# Patient Record
Sex: FEMALE | Race: WHITE | NOT HISPANIC OR LATINO | Employment: OTHER
[De-identification: names, ages, dates, MRNs, and addresses within clinical notes are randomized per-mention and may not be internally consistent; named-entity substitution may affect disease eponyms.]

---

## 2017-04-07 ENCOUNTER — RECORDS - HEALTHEAST (OUTPATIENT)
Dept: ADMINISTRATIVE | Facility: OTHER | Age: 62
End: 2017-04-07

## 2017-04-19 ENCOUNTER — RECORDS - HEALTHEAST (OUTPATIENT)
Dept: ADMINISTRATIVE | Facility: OTHER | Age: 62
End: 2017-04-19

## 2017-05-02 ENCOUNTER — RECORDS - HEALTHEAST (OUTPATIENT)
Dept: ADMINISTRATIVE | Facility: OTHER | Age: 62
End: 2017-05-02

## 2017-05-05 ENCOUNTER — RECORDS - HEALTHEAST (OUTPATIENT)
Dept: ADMINISTRATIVE | Facility: OTHER | Age: 62
End: 2017-05-05

## 2018-03-16 ENCOUNTER — RECORDS - HEALTHEAST (OUTPATIENT)
Dept: ADMINISTRATIVE | Facility: OTHER | Age: 63
End: 2018-03-16

## 2018-05-08 ENCOUNTER — RECORDS - HEALTHEAST (OUTPATIENT)
Dept: ADMINISTRATIVE | Facility: OTHER | Age: 63
End: 2018-05-08

## 2018-09-26 ENCOUNTER — RECORDS - HEALTHEAST (OUTPATIENT)
Dept: ADMINISTRATIVE | Facility: OTHER | Age: 63
End: 2018-09-26

## 2018-09-26 LAB
ALT SERPL W/O P-5'-P-CCNC: 19 U/L (ref 6–29)
AST SERPL-CCNC: 19 U/L (ref 10–35)
CHOLEST SERPL-MCNC: 247 MG/DL
CREAT SERPL-MCNC: 0.8 MG/DL (ref 0.5–0.99)
GFR ESTIMATE EXT - HISTORICAL: 79 ML/MIN/1.73M2
GFR ESTIMATE, IF BLACK EXT - HISTORICAL: 92 ML/MIN/1.73M2
HDLC SERPL-MCNC: 62 MG/DL
LDLC SERPL CALC-MCNC: 167 MG/DL
NON HDL CHOL. (LDL+VLDL): 185 MG/DL
TRIGLYCERIDES (HISTORICAL CONVERSION): 78 MG/DL

## 2018-10-05 ENCOUNTER — TRANSFERRED RECORDS (OUTPATIENT)
Dept: HEALTH INFORMATION MANAGEMENT | Facility: CLINIC | Age: 63
End: 2018-10-05

## 2018-11-12 ENCOUNTER — RECORDS - HEALTHEAST (OUTPATIENT)
Dept: ADMINISTRATIVE | Facility: OTHER | Age: 63
End: 2018-11-12

## 2019-05-03 ENCOUNTER — RECORDS - HEALTHEAST (OUTPATIENT)
Dept: ADMINISTRATIVE | Facility: OTHER | Age: 64
End: 2019-05-03

## 2019-05-03 LAB
ALT SERPL W/O P-5'-P-CCNC: 16 U/L (ref 6–29)
AST SERPL-CCNC: 17 U/L (ref 10–35)
CHOLEST SERPL-MCNC: 196 MG/DL
CREAT SERPL-MCNC: 0.69 MG/DL (ref 0.5–0.99)
HDLC SERPL-MCNC: 53 MG/DL
LDLC SERPL CALC-MCNC: 128 MG/DL
NON HDL CHOL. (LDL+VLDL): 143 MG/DL
TRIGLYCERIDES (HISTORICAL CONVERSION): 59 MG/DL

## 2019-05-07 ENCOUNTER — RECORDS - HEALTHEAST (OUTPATIENT)
Dept: ADMINISTRATIVE | Facility: OTHER | Age: 64
End: 2019-05-07

## 2019-08-06 ENCOUNTER — RECORDS - HEALTHEAST (OUTPATIENT)
Dept: ADMINISTRATIVE | Facility: OTHER | Age: 64
End: 2019-08-06

## 2019-10-24 ENCOUNTER — RECORDS - HEALTHEAST (OUTPATIENT)
Dept: ADMINISTRATIVE | Facility: OTHER | Age: 64
End: 2019-10-24

## 2021-03-31 ENCOUNTER — OFFICE VISIT - HEALTHEAST (OUTPATIENT)
Dept: FAMILY MEDICINE | Facility: CLINIC | Age: 66
End: 2021-03-31

## 2021-03-31 DIAGNOSIS — Z12.11 SCREEN FOR COLON CANCER: ICD-10-CM

## 2021-03-31 DIAGNOSIS — R20.2 ARM PARESTHESIA, LEFT: ICD-10-CM

## 2021-03-31 DIAGNOSIS — S46.812A TRAPEZIUS STRAIN, LEFT, INITIAL ENCOUNTER: ICD-10-CM

## 2021-03-31 DIAGNOSIS — R03.0 ELEVATED BLOOD PRESSURE READING WITHOUT DIAGNOSIS OF HYPERTENSION: ICD-10-CM

## 2021-03-31 DIAGNOSIS — R29.898 LEFT HAND WEAKNESS: ICD-10-CM

## 2021-03-31 RX ORDER — CHLORAL HYDRATE 500 MG
2 CAPSULE ORAL DAILY
Status: SHIPPED | COMMUNITY
Start: 2021-03-31

## 2021-03-31 RX ORDER — OXYBUTYNIN CHLORIDE 10 MG/1
TABLET, EXTENDED RELEASE ORAL
Status: SHIPPED | COMMUNITY
Start: 2021-03-31 | End: 2021-08-05

## 2021-03-31 ASSESSMENT — MIFFLIN-ST. JEOR: SCORE: 1256.88

## 2021-03-31 ASSESSMENT — PATIENT HEALTH QUESTIONNAIRE - PHQ9: SUM OF ALL RESPONSES TO PHQ QUESTIONS 1-9: 6

## 2021-04-05 ENCOUNTER — RECORDS - HEALTHEAST (OUTPATIENT)
Dept: ADMINISTRATIVE | Facility: OTHER | Age: 66
End: 2021-04-05

## 2021-04-08 ENCOUNTER — RECORDS - HEALTHEAST (OUTPATIENT)
Dept: ADMINISTRATIVE | Facility: OTHER | Age: 66
End: 2021-04-08

## 2021-04-19 ENCOUNTER — OFFICE VISIT - HEALTHEAST (OUTPATIENT)
Dept: FAMILY MEDICINE | Facility: CLINIC | Age: 66
End: 2021-04-19

## 2021-04-19 DIAGNOSIS — R03.0 ELEVATED BLOOD PRESSURE READING WITHOUT DIAGNOSIS OF HYPERTENSION: ICD-10-CM

## 2021-04-19 DIAGNOSIS — E04.1 THYROID NODULE: ICD-10-CM

## 2021-04-19 LAB
ANION GAP SERPL CALCULATED.3IONS-SCNC: 9 MMOL/L (ref 5–18)
BUN SERPL-MCNC: 13 MG/DL (ref 8–22)
CALCIUM SERPL-MCNC: 8.2 MG/DL (ref 8.5–10.5)
CHLORIDE BLD-SCNC: 107 MMOL/L (ref 98–107)
CO2 SERPL-SCNC: 25 MMOL/L (ref 22–31)
CREAT SERPL-MCNC: 0.7 MG/DL (ref 0.6–1.1)
GFR SERPL CREATININE-BSD FRML MDRD: >60 ML/MIN/1.73M2
GLUCOSE BLD-MCNC: 89 MG/DL (ref 70–125)
POTASSIUM BLD-SCNC: 3.7 MMOL/L (ref 3.5–5)
SODIUM SERPL-SCNC: 141 MMOL/L (ref 136–145)
TSH SERPL DL<=0.005 MIU/L-ACNC: 1.31 UIU/ML (ref 0.3–5)

## 2021-04-19 RX ORDER — MELOXICAM 15 MG/1
TABLET ORAL
Status: SHIPPED | COMMUNITY
Start: 2021-04-05 | End: 2021-08-03

## 2021-04-19 RX ORDER — GABAPENTIN 300 MG/1
CAPSULE ORAL
Status: SHIPPED | COMMUNITY
Start: 2021-04-05 | End: 2022-07-26

## 2021-04-19 ASSESSMENT — MIFFLIN-ST. JEOR: SCORE: 1265.96

## 2021-04-20 ENCOUNTER — RECORDS - HEALTHEAST (OUTPATIENT)
Dept: HEALTH INFORMATION MANAGEMENT | Facility: CLINIC | Age: 66
End: 2021-04-20

## 2021-05-10 ENCOUNTER — RECORDS - HEALTHEAST (OUTPATIENT)
Dept: ADMINISTRATIVE | Facility: OTHER | Age: 66
End: 2021-05-10

## 2021-05-24 ENCOUNTER — RECORDS - HEALTHEAST (OUTPATIENT)
Dept: ADMINISTRATIVE | Facility: CLINIC | Age: 66
End: 2021-05-24

## 2021-05-25 ENCOUNTER — RECORDS - HEALTHEAST (OUTPATIENT)
Dept: ADMINISTRATIVE | Facility: CLINIC | Age: 66
End: 2021-05-25

## 2021-05-27 ASSESSMENT — PATIENT HEALTH QUESTIONNAIRE - PHQ9: SUM OF ALL RESPONSES TO PHQ QUESTIONS 1-9: 6

## 2021-06-05 VITALS
HEART RATE: 66 BPM | HEIGHT: 68 IN | TEMPERATURE: 97.5 F | DIASTOLIC BLOOD PRESSURE: 67 MMHG | WEIGHT: 150 LBS | BODY MASS INDEX: 22.73 KG/M2 | SYSTOLIC BLOOD PRESSURE: 140 MMHG

## 2021-06-05 VITALS
BODY MASS INDEX: 22.43 KG/M2 | HEIGHT: 68 IN | TEMPERATURE: 97.6 F | SYSTOLIC BLOOD PRESSURE: 154 MMHG | HEART RATE: 73 BPM | DIASTOLIC BLOOD PRESSURE: 81 MMHG | WEIGHT: 148 LBS

## 2021-06-16 NOTE — PATIENT INSTRUCTIONS - HE
Steroid Dosepak to help with inflammation, continue tizanidine.  If needed you can take Tylenol for additional pain relief.    We will help you schedule with [facility] for additional care.

## 2021-06-16 NOTE — PROGRESS NOTES
Assessment/plan   1. Trapezius strain, left, initial encounter  2. Arm paresthesia, left  3. Left hand weakness  Acute onset atraumatic left lateral mid back pain with associated left arm paresthesias and weakness.  Left trapezius and rhomboids tender to palpation on exam, with notably decreased left  strength.  She has already completed a short course of prednisone with minimal improvement.  We will have her complete a Medrol Dosepak, continue with tizanidine, and Tylenol as needed.  Given associated neurologic deficits due to suspected nerve impingement we will have her see orthopedics, scheduled 4/5/2021.  - Ambulatory referral to Orthopedics  - methylPREDNISolone (MEDROL DOSEPACK) 4 mg tablet; Take 1 tablet (4 mg total) by mouth daily for 6 days. Follow package directions  Dispense: 21 tablet; Refill: 0    4. Screen for colon cancer  Initial screen, no FH of colon cancer.   - Ambulatory referral for Colonoscopy    5. Elevated blood pressure reading without diagnosis of hypertension  Blood pressure elevated today, and has been borderline in the past per her report.  She is in acute discomfort today.  She will check her blood pressure 1-2 times daily at home and return in 2 weeks for follow-up.    Follow up: 2 weeks BP    Gregoria Michaels DO    Options for treatment and follow-up care were reviewed with the patient. Felicita Henderson engaged in the decision making process and verbalized understanding of the options discussed and agreed with the final plan.    This note has been dictated using voice recognition software. Any grammatical or context distortions are unintentional and inherent to the software.      Subjective:      HPI: Felicita Henderson is a 65 y.o. female who is here for:    Chief Complaint   Patient presents with     Establish Care     has lived in [place] for the last 15 years     Muscle Pain     muscle pain f/u from urgency room, center of shoulder blade pain, left arm has been numb for a week, no   "in hand     Evening of 3/23 developed pain in right shoulder blade, deep under bone.  No trauma, unsure what caused its onset.  Dull ache, no shooting pains. Left arm is numb with asociated pain of left elbow. Weakness in left hand, no  strength.  Unable to open pill bottles, flush toilet due to weak .  She is a right-handed person.  Took 5 days of prednisone from urgent care visit 3/25. Thought she was getting better but then symptoms returned. Taking tizanidine three times a day, little relief with this and did not take any today. No pain in neck, can feel pullig sensation under her left shoulder blade. No headaches or vision changes.     Objective:    /81   Pulse 73   Temp 97.6  F (36.4  C) (Oral)   Ht 5' 7.5\" (1.715 m)   Wt 148 lb (67.1 kg)   BMI 22.84 kg/m      Physical Exam:   General: Alert, pleasant, cooperative, NAD  CV: RRR  Respiratory: normal effort, lungs CTAB with good aeration throughout  Abdomen:  soft,  ND, NT  Back: No CVA tenderness  Neck: Cervical spine non-tender to palpation. Full ROM. Negative Spurlings.  Shoulder: Skin without erythema, swelling or ecchymosis. Full ROM.  Tender to palpation left rhomboids and trapezius, deep to medial spine of left scapula.  No winging of scapula or abnormal scapular movements noted. No signs of instability with apprehension and relocation maneuvers.   Psych: mood neutral and affect appropriate  Neuro: A&O x 3, CN II-XII grossly intact, paresthesias proximal left upper extremity and at elbow.  Left  strength 2/5.  Skin: warm, no rashes on exposed areas of skin    No results found for this or any previous visit (from the past 24 hour(s)).  "

## 2021-06-16 NOTE — PROGRESS NOTES
Assessment/plan   1. Elevated blood pressure reading without diagnosis of hypertension  Blood pressure initially elevated but at goal on recheck.  Patient would like to avoid medication, so feel it is fine to hold off for now.  We will get baseline electrolyte and renal function in case medications need to be started in the future.  In the meantime she we will continue to work on a healthy diet and try to incorporate more exercise into her routine.  - Basic Metabolic Panel    2. Thyroid nodule  History of thyroid nodule s/p FNA in 9/2015 which was negative for atypia but showed some features of possible lymphocytic thyroiditis.  Recent cervical MRI through [facility] showed a 29 mm left thyroid nodule.  Will assess further with thyroid ultrasound and thyroid cascade.  Nontender on exam today.  - Thyroid [place]  - US Thyroid; Future      Follow up: 1 month routine physical    Gregoria Michaels DO    Options for treatment and follow-up care were reviewed with the patient. Felicita Henderson engaged in the decision making process and verbalized understanding of the options discussed and agreed with the final plan.    This note has been dictated using voice recognition software. Any grammatical or context distortions are unintentional and inherent to the software.      Subjective:      HPI: Felicita Henderson is a 65 y.o. female who is here for:    Chief Complaint   Patient presents with     Hypertension     f/u     Patient returns for blood pressure follow-up.  She has not been checking her blood pressure at home.  She states she checked it once and it was high, approximately 160 systolic.  She feels her blood pressure was elevated during her last visit due to acute back pain for which she has been seeing [facility] for.  She had a herniated disc which is slowly improving with meloxicam.  Her brother is also being treated for high blood pressure.  She has been watching her cholesterol but has not been doing  "much activity.  She ideally would like to avoid medication if possible.    She also would like to discuss her thyroid nodule which was discovered in 9/2015.  She had an aspiration at that time.  She states her thyroid function was normal.  She does not believe it has grown in size.  Was recently seen on her MRI from [facility].    Objective:    /67   Pulse 66   Temp 97.5  F (36.4  C) (Oral)   Ht 5' 7.5\" (1.715 m)   Wt 150 lb (68 kg)   BMI 23.15 kg/m      Physical Exam:   General: Alert, pleasant, cooperative, NAD  HEENT: Left inferior thyroid nodule approximately 2 to 3 cm, nontender  CV: RRR, no murmurs, rubs or gallops  Respiratory: normal effort, lungs CTAB with good aeration throughout  Psych: mood neutral and affect appropriate    No results found for this or any previous visit (from the past 24 hour(s)).  "

## 2021-08-02 ENCOUNTER — HOSPITAL ENCOUNTER (OUTPATIENT)
Dept: ULTRASOUND IMAGING | Facility: HOSPITAL | Age: 66
Discharge: HOME | End: 2021-08-02
Attending: FAMILY MEDICINE | Admitting: FAMILY MEDICINE
Payer: MEDICARE

## 2021-08-02 DIAGNOSIS — E04.1 THYROID NODULE: ICD-10-CM

## 2021-08-02 PROCEDURE — 76536 US EXAM OF HEAD AND NECK: CPT

## 2021-08-03 ENCOUNTER — OFFICE VISIT (OUTPATIENT)
Dept: FAMILY MEDICINE | Facility: CLINIC | Age: 66
End: 2021-08-03
Payer: MEDICARE

## 2021-08-03 ENCOUNTER — TELEPHONE (OUTPATIENT)
Dept: FAMILY MEDICINE | Facility: CLINIC | Age: 66
End: 2021-08-03

## 2021-08-03 VITALS
TEMPERATURE: 98 F | BODY MASS INDEX: 22.28 KG/M2 | HEART RATE: 77 BPM | WEIGHT: 147 LBS | DIASTOLIC BLOOD PRESSURE: 79 MMHG | SYSTOLIC BLOOD PRESSURE: 121 MMHG | HEIGHT: 68 IN

## 2021-08-03 DIAGNOSIS — E78.2 MIXED HYPERLIPIDEMIA: ICD-10-CM

## 2021-08-03 DIAGNOSIS — Z78.0 POSTMENOPAUSAL STATUS: ICD-10-CM

## 2021-08-03 DIAGNOSIS — I49.9 IRREGULAR HEARTBEAT: ICD-10-CM

## 2021-08-03 DIAGNOSIS — Z00.00 ENCOUNTER FOR MEDICARE ANNUAL WELLNESS EXAM: Primary | ICD-10-CM

## 2021-08-03 DIAGNOSIS — Z23 NEED FOR PNEUMOCOCCAL VACCINE: ICD-10-CM

## 2021-08-03 DIAGNOSIS — Z86.79 HISTORY OF MITRAL VALVE PROLAPSE: ICD-10-CM

## 2021-08-03 DIAGNOSIS — Z12.11 SCREEN FOR COLON CANCER: ICD-10-CM

## 2021-08-03 DIAGNOSIS — Z12.31 ENCOUNTER FOR SCREENING MAMMOGRAM FOR MALIGNANT NEOPLASM OF BREAST: ICD-10-CM

## 2021-08-03 DIAGNOSIS — E04.1 THYROID NODULE: ICD-10-CM

## 2021-08-03 DIAGNOSIS — M85.80 OSTEOPENIA, UNSPECIFIED LOCATION: ICD-10-CM

## 2021-08-03 PROBLEM — E78.5 HYPERLIPIDEMIA: Status: ACTIVE | Noted: 2021-08-03

## 2021-08-03 LAB
CHOLEST SERPL-MCNC: 247 MG/DL
ERYTHROCYTE [DISTWIDTH] IN BLOOD BY AUTOMATED COUNT: 13.1 % (ref 10–15)
FASTING STATUS PATIENT QL REPORTED: ABNORMAL
HCT VFR BLD AUTO: 41.4 % (ref 35–47)
HDLC SERPL-MCNC: 57 MG/DL
HGB BLD-MCNC: 13.4 G/DL (ref 11.7–15.7)
LDLC SERPL CALC-MCNC: 175 MG/DL
MCH RBC QN AUTO: 27.9 PG (ref 26.5–33)
MCHC RBC AUTO-ENTMCNC: 32.4 G/DL (ref 31.5–36.5)
MCV RBC AUTO: 86 FL (ref 78–100)
PLATELET # BLD AUTO: 207 10E3/UL (ref 150–450)
RBC # BLD AUTO: 4.8 10E6/UL (ref 3.8–5.2)
TRIGL SERPL-MCNC: 76 MG/DL
WBC # BLD AUTO: 3.9 10E3/UL (ref 4–11)

## 2021-08-03 PROCEDURE — 90471 IMMUNIZATION ADMIN: CPT | Performed by: FAMILY MEDICINE

## 2021-08-03 PROCEDURE — 90715 TDAP VACCINE 7 YRS/> IM: CPT | Performed by: FAMILY MEDICINE

## 2021-08-03 PROCEDURE — 85027 COMPLETE CBC AUTOMATED: CPT | Performed by: FAMILY MEDICINE

## 2021-08-03 PROCEDURE — 93005 ELECTROCARDIOGRAM TRACING: CPT | Performed by: FAMILY MEDICINE

## 2021-08-03 PROCEDURE — 36415 COLL VENOUS BLD VENIPUNCTURE: CPT | Performed by: FAMILY MEDICINE

## 2021-08-03 PROCEDURE — 93010 ELECTROCARDIOGRAM REPORT: CPT | Mod: OFF | Performed by: INTERNAL MEDICINE

## 2021-08-03 PROCEDURE — 99214 OFFICE O/P EST MOD 30 MIN: CPT | Mod: 25 | Performed by: FAMILY MEDICINE

## 2021-08-03 PROCEDURE — 80061 LIPID PANEL: CPT | Performed by: FAMILY MEDICINE

## 2021-08-03 PROCEDURE — G0402 INITIAL PREVENTIVE EXAM: HCPCS | Performed by: FAMILY MEDICINE

## 2021-08-03 PROCEDURE — G0009 ADMIN PNEUMOCOCCAL VACCINE: HCPCS | Mod: 59 | Performed by: FAMILY MEDICINE

## 2021-08-03 PROCEDURE — 90732 PPSV23 VACC 2 YRS+ SUBQ/IM: CPT | Performed by: FAMILY MEDICINE

## 2021-08-03 ASSESSMENT — ACTIVITIES OF DAILY LIVING (ADL): CURRENT_FUNCTION: NO ASSISTANCE NEEDED

## 2021-08-03 ASSESSMENT — MIFFLIN-ST. JEOR: SCORE: 1252.35

## 2021-08-03 NOTE — PATIENT INSTRUCTIONS
Please call your insurance company to check on Shingrix vaccine coverage (the updated shingles vaccine)    Check on thyroid ultrasound from 2015.       Patient Education   Personalized Prevention Plan  You are due for the preventive services outlined below.  Your care team is available to assist you in scheduling these services.  If you have already completed any of these items, please share that information with your care team to update in your medical record.  Health Maintenance Due   Topic Date Due     ANNUAL REVIEW OF HM ORDERS  Never done     Discuss Advance Care Planning  Paperwork given     Colorectal Cancer Screening  Never done - referral today     Diptheria Tetanus Pertussis (DTAP/TDAP/TD) Vaccine (1 - Tdap) Never done - today     Zoster (Shingles) Vaccine (1 of 2) Never done - call insurance     FALL RISK ASSESSMENT  Never done -  Done today     Pneumococcal Vaccine (1 of 1 - PPSV23) Never done - today

## 2021-08-03 NOTE — PROGRESS NOTES
"SUBJECTIVE:   Felicita Henderson is a 65 year old female who presents for Preventive Visit.    Patient has been advised of split billing requirements and indicates understanding: Yes   Are you in the first 12 months of your Medicare coverage?  Yes    Healthy Habits:     In general, how would you rate your overall health?  Good    Frequency of exercise:  4-5 days/week    Duration of exercise:  15-30 minutes    Do you usually eat at least 4 servings of fruit and vegetables a day, include whole grains    & fiber and avoid regularly eating high fat or \"junk\" foods?  Yes    Taking medications regularly:  No    Barriers to taking medications:  Not applicable    Medication side effects:  None    Ability to successfully perform activities of daily living:  No assistance needed    Home Safety:  No safety concerns identified    Hearing Impairment:  No hearing concerns    In the past 6 months, have you been bothered by leaking of urine? Yes    In general, how would you rate your overall mental or emotional health?  Good      PHQ-2 Total Score: 0    Additional concerns today:  No    Do you feel safe in your environment? Yes    Have you ever done Advance Care Planning? (For example, a Health Directive, POLST, or a discussion with a medical provider or your loved ones about your wishes): No, advance care planning information given to patient to review.  Patient plans to discuss their wishes with loved ones or provider.      Fall risk  Fallen 2 or more times in the past year?: No  Any fall with injury in the past year?: No  Cognitive Screening   1) Repeat 3 items (Leader, Season, Table)  : NORMAL  2) Clock draw: NORMAL  3) 3 item recall: Recalls 3 objects  Results: 3 items recalled: COGNITIVE IMPAIRMENT LESS LIKELY    Mini-CogTM Copyright CHRISTIE Sales. Licensed by the author for use in [facility]; reprinted with permission (anil@.[email or web address]). All rights reserved.        Vision screen:  No corrective lenses  Tool used " Morse  Right eye 20/20  Left eye 20/20  Visual acuity pass  H+ lens screening Pass      Reviewed and updated as needed this visit by clinical staff  Tobacco  Allergies  Meds  Problems  Med Hx  Surg Hx  Fam Hx          Reviewed and updated as needed this visit by Provider  Tobacco  Allergies  Meds  Problems  Med Hx  Surg Hx  Fam Hx         Social History     Tobacco Use     Smoking status: Never Smoker     Smokeless tobacco: Never Used   Substance Use Topics     Alcohol use: Yes       Alcohol Use 8/3/2021   Prescreen: >3 drinks/day or >7 drinks/week? No     Current providers sharing in care for this patient include:   Patient Care Team:  Gregoria Michaels DO as PCP - General  Gregoria Michaels DO as Assigned PCP    The following health maintenance items are reviewed in Epic and correct as of today:  Health Maintenance Due   Topic Date Due     ANNUAL REVIEW OF  ORDERS  Never done     ADVANCE CARE PLANNING  Never done     COLORECTAL CANCER SCREENING  Never done     ZOSTER IMMUNIZATION (1 of 2) Never done     FALL RISK ASSESSMENT  Never done     Lab work is in process  Labs reviewed in EPIC  Patient Active Problem List   Diagnosis     Overactive bladder     History of malignant neoplasm of uterine body     Hyperlipidemia     Osteopenia     Thyroid nodule     Mitral valve prolapse     Past Surgical History:   Procedure Laterality Date     BLADDER SUSPENSION  08/2010     HYSTERECTOMY  04/2014     WISDOM TOOTH EXTRACTION         Social History     Tobacco Use     Smoking status: Never Smoker     Smokeless tobacco: Never Used   Substance Use Topics     Alcohol use: Yes     Family History   Problem Relation Age of Onset     Hypertension Mother      Diabetes Type 1 Mother      Thyroid Cancer Mother      Alzheimer Disease Father      Diabetes Type 1 Brother          Current Outpatient Medications   Medication Sig Dispense Refill     ergocalciferol, vitamin D2, (VITAMIN D2 ORAL) [ERGOCALCIFEROL, VITAMIN D2, (VITAMIN  "D2 ORAL)] Take by mouth.       fish oil-omega-3 fatty acids (FISH OIL) 300-1,000 mg capsule [FISH OIL-OMEGA-3 FATTY ACIDS (FISH OIL) 300-1,000 MG CAPSULE] Take 2 g by mouth daily.       gabapentin (NEURONTIN) 300 MG capsule [GABAPENTIN (NEURONTIN) 300 MG CAPSULE] TAKE 1 CAPSULE AT BEDTIME X 5 DAYS, THEN TAKE 2 CAPSULES AT NIGHT THEREAFTER.       green tea leaf xt/green tea lf (GREEN TEA COMPLEX ORAL) [GREEN TEA LEAF XT/GREEN TEA LF (GREEN TEA COMPLEX ORAL)] Take by mouth.       Multiple Vitamins-Minerals ([employer] BONE HEALTH PO)        MULTIVITAMIN ORAL [MULTIVITAMIN ORAL] Take by mouth.       oxybutynin (DITROPAN XL) 10 MG ER tablet [OXYBUTYNIN (DITROPAN XL) 10 MG ER TABLET] oxybutynin chloride ER 10 mg tablet,extended release 24 hr   TAKE 1 TABLET BY MOUTH DAILY       FHS-7: No flowsheet data found.  Mammogram Screening: Recommended mammography every 1-2 years with patient discussion and risk factor consideration  Pertinent mammograms are reviewed under the imaging tab.    OBJECTIVE:   /79   Pulse 77   Temp 98  F (36.7  C) (Oral)   Ht 1.715 m (5' 7.5\")   Wt 66.7 kg (147 lb)   BMI 22.68 kg/m   Estimated body mass index is 22.68 kg/m  as calculated from the following:    Height as of this encounter: 1.715 m (5' 7.5\").    Weight as of this encounter: 66.7 kg (147 lb).  Physical Exam  GENERAL: healthy, alert and no distress  EYES: Eyes grossly normal to inspection, PERRL and conjunctivae and sclerae normal  HENT: ear canals and TM's normal, nose and mouth without ulcers or lesions  NECK: no adenopathy, no asymmetry, nontender left inferior thyroid nodule  RESP: lungs clear to auscultation - no rales, rhonchi or wheezes  CV: Irregular, normal S1 S2, no S3 or S4, no murmur, click or rub, no peripheral edema  ABDOMEN: soft, nontender  MS: no gross musculoskeletal defects noted, no edema  SKIN: no suspicious lesions or rashes  NEURO: Normal strength and tone, mentation intact and speech normal  PSYCH: mentation " appears normal, affect normal/bright    EKG personally reviewed normal sinus rhythm, normal intervals, no ST or T wave changes    ASSESSMENT / PLAN:   1. Encounter for Medicare annual wellness exam  Reviewed and updated patient's past medical, surgical, family, social history, along with current medications and allergies.  Updated patient's health maintenance as further outlined below.   COUNSELING:  Reviewed preventive health counseling, as reflected in patient instructions  Special attention given to:       Regular exercise       Healthy diet/nutrition       Osteoporosis prevention/bone health       Colon cancer screening       Hepatitis C screening       HIV screening for high risk patient       Advanced Planning     - TDAP VACCINE (Adacel, Boostrix)  [[id number]]  - PPSV23, IM/SUBQ (2+ YRS) - [id number]  - CBC with platelets    2. History of mitral valve prolapse  3. Irregular heartbeat  Irregular heartbeat noted on exam today, though EKG reveals normal sinus rhythm so suspect frequent PVCs not picked up on tracing.  She also has history of MVP which has not been followed up on in many years.  Recommend echocardiogram to assess heart function, though clinically well compensated.  - EKG 12-lead, tracing only    4. Mixed hyperlipidemia  - Lipid panel    5. Osteopenia, unspecified location  6. Postmenopausal status  Supplementing with vitamin D and calcium.  Last DEXA scan from 2018 revealed osteopenia, repeat and determine if additional pharmacotherapy is warranted.  - DX Hip/Pelvis/Spine; Future    7. Encounter for screening mammogram for malignant neoplasm of breast  - MA Screen Bilateral w/Vishnu; Future    8. Screen for colon cancer  Initial screen. Family history   - Adult Gastro Ref - Procedure Only; Future    9. Need for pneumococcal vaccine  - PPSV23, IM/SUBQ (2+ YRS) - [id number]    10. Thyroid nodule  History of palpable left thyroid nodule with normal FNA in 2015.  Recent repeat ultrasound demonstrates  size of 4.3 x 2.9 x 2.4 cm nodule with mixed cystic and solid components.  Unable to see prior thyroid ultrasound in chart, which was apparently ordered by her gynecologist in [place].  We will have her complete a records release to obtain these results.  If enlarging, would recommend repeat FNA.      Reviewed patients plan of care and provided an AVS. The Basic Care Plan (routine screening as documented in Health Maintenance) for Felicita meets the Care Plan requirement. This Care Plan has been established and reviewed with the Patient.    Gregoria Michaels DO  [facility]

## 2021-08-03 NOTE — TELEPHONE ENCOUNTER
Called and left detailed message stating I am filling out a MIKE for her thyroid ultrasound from 2015. PCP states it was ordered by her OBGYN, per patient, and I am wondering where to send the MIKE.   Please ask patient where her OBGYN is that ordered the thyroid ultrasound in 2015.

## 2021-08-04 LAB
ATRIAL RATE - MUSE: 63 BPM
DIASTOLIC BLOOD PRESSURE - MUSE: NORMAL MMHG
INTERPRETATION ECG - MUSE: NORMAL
P AXIS - MUSE: 67 DEGREES
PR INTERVAL - MUSE: 170 MS
QRS DURATION - MUSE: 90 MS
QT - MUSE: 412 MS
QTC - MUSE: 421 MS
R AXIS - MUSE: 59 DEGREES
SYSTOLIC BLOOD PRESSURE - MUSE: NORMAL MMHG
T AXIS - MUSE: 46 DEGREES
VENTRICULAR RATE- MUSE: 63 BPM

## 2021-08-04 NOTE — TELEPHONE ENCOUNTER
Spoke to pt and she said [facility] in [place], [place] is who ordered the US. Will fax GISELLE there

## 2021-08-05 NOTE — TELEPHONE ENCOUNTER
Pt called back, stated she has the report and will attach to a my chart message and send to Dr Michaels.

## 2021-08-05 NOTE — TELEPHONE ENCOUNTER
Called patient and left detailed message for her stating we need to know what doc ordered the [place] ultrasound [place]. [place] said they have multiple clinics and didn't want to give me a fax number to the wrong one.

## 2021-08-09 ENCOUNTER — TELEPHONE (OUTPATIENT)
Dept: FAMILY MEDICINE | Facility: CLINIC | Age: 66
End: 2021-08-09

## 2021-08-09 DIAGNOSIS — E04.1 THYROID NODULE: Primary | ICD-10-CM

## 2021-08-09 NOTE — CONFIDENTIAL NOTE
Called patient regarding recent thyroid nodule ultrasound.  Reviewed prior study from 2015 and 2018, the lesion has slightly grown in size.: 3.8x1.8x2.9-->3.9x1.7x2.8-->4.3x2.9x2.4cm.  Should consider a repeat FNA to ensure this lesion remains benign.  Will await callback from patient or discuss.    Gregoria Michaels, DO

## 2021-08-09 NOTE — CONFIDENTIAL NOTE
Discussed repeat FNA thyroid nodule with patient, given growth over the past 3 years she elects to proceed with FNA biopsy.    Reviewed patient's risk of primary event based on ASCVD score.  Father with history heart disease and Alzheimer's which patient feels statin contributed to.  She would like to avoid statin medications if possible.  We discussed rechecking cholesterol levels in a year versus obtaining a CT coronary calcium score.  Patient elects to wait a year and if still in the intermediate risk zone, will proceed with calcium scoring.    The 10-year ASCVD risk score (Carlos NAG Jr., et al., 2013) is: 5.5%    Values used to calculate the score:      Age: 65 years      Sex: Female      Is Non- : No      Diabetic: No      Tobacco smoker: No      Systolic Blood Pressure: 121 mmHg      Is BP treated: No      HDL Cholesterol: 57 mg/dL      Total Cholesterol: 247 mg/dL      Gregoria Michaels DO

## 2021-08-09 NOTE — TELEPHONE ENCOUNTER
Pt calling back- did review note with pt, she is unsure if she wants to have FNA but would like to discuss with Dr Michaels.  Pt available at [phone], ok to leave detailed msg

## 2021-08-09 NOTE — TELEPHONE ENCOUNTER
----- Message from Gregoria Michaels DO sent at 8/9/2021  7:14 AM CDT -----  Regarding: FW: Missing Vision screen  Would we be able to bring this patient back for a vision screen so we can bill her visit?    Gregoria Michaels DO      ----- Message -----  From: Maria Isabel Esposito  Sent: 8/8/2021   7:20 AM CDT  To: Gregoria Michaels DO  Subject: Missing Vision screen                            DOS 8/3/21    Hi Dr. Michaels,    Patient's Medicare Part B became effective 12/1/20,  Within the first 12 months of eligibility only the WTM () is covered.  The WTM requires a vision screen - will you or your MA please contact the patient to see if they are willing to come back in and do the vision screen.    We can't charge without it.    Please let me know.    Thank you,  Sandra

## 2021-08-11 ENCOUNTER — ALLIED HEALTH/NURSE VISIT (OUTPATIENT)
Dept: FAMILY MEDICINE | Facility: CLINIC | Age: 66
End: 2021-08-11
Payer: MEDICARE

## 2021-08-11 DIAGNOSIS — Z00.00 ENCOUNTER FOR MEDICARE ANNUAL WELLNESS EXAM: Primary | ICD-10-CM

## 2021-08-11 PROCEDURE — 99207 PR NO CHARGE NURSE ONLY: CPT

## 2021-08-11 NOTE — PROGRESS NOTES
VISION   No corrective lenses  Tool used: Lito   Right eye:        10/10 (20/20)  Left eye:          10/10 (20/20)  Visual Acuity: Pass  H Plus Lens Screening: Pass

## 2021-10-16 ENCOUNTER — HEALTH MAINTENANCE LETTER (OUTPATIENT)
Age: 66
End: 2021-10-16

## 2021-11-01 ENCOUNTER — TRANSFERRED RECORDS (OUTPATIENT)
Dept: HEALTH INFORMATION MANAGEMENT | Facility: CLINIC | Age: 66
End: 2021-11-01
Payer: MEDICARE

## 2022-02-08 ENCOUNTER — TRANSFERRED RECORDS (OUTPATIENT)
Dept: HEALTH INFORMATION MANAGEMENT | Facility: CLINIC | Age: 67
End: 2022-02-08
Payer: MEDICARE

## 2022-06-24 ENCOUNTER — TELEPHONE (OUTPATIENT)
Dept: FAMILY MEDICINE | Facility: CLINIC | Age: 67
End: 2022-06-24

## 2022-06-24 NOTE — TELEPHONE ENCOUNTER
If patient is having symptoms she needs appointment to discuss since referral would not be for a routine screening colonoscopy.     Gregoria Michaels, DO

## 2022-06-24 NOTE — TELEPHONE ENCOUNTER
Please send new referral to [facility] for patient to have colonoscopy.  Patient is having symptoms and the referral they have is from 4/2021.

## 2022-07-23 DIAGNOSIS — R35.0 URINARY FREQUENCY: ICD-10-CM

## 2022-07-23 RX ORDER — OXYBUTYNIN CHLORIDE 10 MG/1
TABLET, EXTENDED RELEASE ORAL
Qty: 90 TABLET | Refills: 3 | Status: SHIPPED | OUTPATIENT
Start: 2022-07-23 | End: 2023-07-19

## 2022-07-24 NOTE — TELEPHONE ENCOUNTER
"Last Written Prescription Date:  8/5/2021  Last Fill Quantity: 90,  # refills: 3   Last office visit provider:  8/3/2021     Requested Prescriptions   Pending Prescriptions Disp Refills     oxybutynin ER (DITROPAN XL) 10 MG 24 hr tablet [Pharmacy Med Name: OXYBUTYNIN CL ER 10 MG TABLET] 90 tablet 3     Sig: TAKE 1 TABLET BY MOUTH EVERY DAY       Muscarinic Antagonists (Urinary Incontinence Agents) Passed - 7/23/2022  7:18 AM        Passed - Recent (12 mo) or future (30 days) visit within the authorizing provider's specialty     Patient has had an office visit with the authorizing provider or a provider within the authorizing providers department within the previous 12 mos or has a future within next 30 days. See \"Patient Info\" tab in inbasket, or \"Choose Columns\" in Meds & Orders section of the refill encounter.              Passed - Medication is Oxybutynin and patient is 5 years of age or older        Passed - Patient does not have a diagnosis of glaucoma on the problem list     If glaucoma diagnosis is new, refer refill to physician.          Passed - Medication is active on med list        Passed - Patient is 18 years of age or older             Makenna Gonzalez RN 07/23/22 11:30 PM  "

## 2022-07-26 ENCOUNTER — OFFICE VISIT (OUTPATIENT)
Dept: FAMILY MEDICINE | Facility: CLINIC | Age: 67
End: 2022-07-26
Payer: MEDICARE

## 2022-07-26 VITALS
SYSTOLIC BLOOD PRESSURE: 141 MMHG | HEIGHT: 68 IN | BODY MASS INDEX: 21.82 KG/M2 | DIASTOLIC BLOOD PRESSURE: 73 MMHG | HEART RATE: 74 BPM | WEIGHT: 144 LBS

## 2022-07-26 DIAGNOSIS — K52.9 CHRONIC DIARRHEA: Primary | ICD-10-CM
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
## 2022-07-26 DIAGNOSIS — F51.04 PSYCHOPHYSIOLOGICAL INSOMNIA: ICD-10-CM

## 2022-07-26 DIAGNOSIS — E78.2 MIXED HYPERLIPIDEMIA: ICD-10-CM

## 2022-07-26 DIAGNOSIS — Z12.11 SCREEN FOR COLON CANCER: ICD-10-CM

## 2022-07-26 DIAGNOSIS — R06.83 SNORING: ICD-10-CM

## 2022-07-26 DIAGNOSIS — Z13.29 SCREENING FOR THYROID DISORDER: ICD-10-CM

## 2022-07-26 DIAGNOSIS — R15.9 INCONTINENCE OF FECES, UNSPECIFIED FECAL INCONTINENCE TYPE: ICD-10-CM

## 2022-07-26 LAB
CHOLEST SERPL-MCNC: 248 MG/DL
ERYTHROCYTE [DISTWIDTH] IN BLOOD BY AUTOMATED COUNT: 13.7 % (ref 10–15)
HCT VFR BLD AUTO: 40.8 % (ref 35–47)
HDLC SERPL-MCNC: 65 MG/DL
HGB BLD-MCNC: 13.1 G/DL (ref 11.7–15.7)
LDLC SERPL CALC-MCNC: 170 MG/DL
MCH RBC QN AUTO: 27.6 PG (ref 26.5–33)
MCHC RBC AUTO-ENTMCNC: 32.1 G/DL (ref 31.5–36.5)
MCV RBC AUTO: 86 FL (ref 78–100)
NONHDLC SERPL-MCNC: 183 MG/DL
PLATELET # BLD AUTO: 211 10E3/UL (ref 150–450)
RBC # BLD AUTO: 4.75 10E6/UL (ref 3.8–5.2)
TRIGL SERPL-MCNC: 64 MG/DL
TSH SERPL DL<=0.005 MIU/L-ACNC: 3.3 UIU/ML (ref 0.3–4.2)
WBC # BLD AUTO: 4.3 10E3/UL (ref 4–11)

## 2022-07-26 PROCEDURE — 99214 OFFICE O/P EST MOD 30 MIN: CPT | Mod: 25 | Performed by: FAMILY MEDICINE

## 2022-07-26 PROCEDURE — 0054A COVID-19,PF,PFIZER (12+ YRS): CPT | Performed by: FAMILY MEDICINE

## 2022-07-26 PROCEDURE — 36415 COLL VENOUS BLD VENIPUNCTURE: CPT | Performed by: FAMILY MEDICINE

## 2022-07-26 PROCEDURE — 80061 LIPID PANEL: CPT | Performed by: FAMILY MEDICINE

## 2022-07-26 PROCEDURE — 84443 ASSAY THYROID STIM HORMONE: CPT | Performed by: FAMILY MEDICINE

## 2022-07-26 PROCEDURE — 91305 COVID-19,PF,PFIZER (12+ YRS): CPT | Performed by: FAMILY MEDICINE

## 2022-07-26 PROCEDURE — 85027 COMPLETE CBC AUTOMATED: CPT | Performed by: FAMILY MEDICINE

## 2022-07-26 RX ORDER — PHENOL 1.4 %
10 AEROSOL, SPRAY (ML) MUCOUS MEMBRANE
COMMUNITY
End: 2023-02-10

## 2022-07-26 RX ORDER — GABAPENTIN 300 MG/1
300 CAPSULE ORAL
Qty: 30 CAPSULE | Refills: 1 | Status: SHIPPED | OUTPATIENT
Start: 2022-07-26 | End: 2023-10-24

## 2022-07-26 NOTE — PROGRESS NOTES
Assessment & Plan     Chronic diarrhea  Incontinence of feces, unspecified fecal incontinence type  Screen for colon cancer  Chronic history of loose stools with intermittent fecal incontinence.  No prior colon cancer screenings, so referral placed for diagnostic colonoscopy given her history.  Blood counts normal, thyroid level pending.  Okay to use Imodium as needed and focus on fiber to bulk the stool.  - TSH with free T4 reflex  - CBC with platelets  - Adult GI  Referral - Procedure Only    Screening for thyroid disorder  - TSH with free T4 reflex    Insect bite, unspecified site, initial encounter  Recommended coverings begin while weeding in the garden, and using DEET insect repellent.  Okay to use nonsedating oral antihistamine as needed and topical hydrocortisone as needed pruritus.    Mixed hyperlipidemia  Reassess ASCVD risk and determine need for statin.  - Lipid Profile (Chol, Trig, HDL, LDL calc)    Psychophysiological insomnia  Reviewed basic sleep hygiene and since gabapentin has been helpful in the past, refill provided.  - gabapentin (NEURONTIN) 300 MG capsule  Dispense: 30 capsule; Refill: 1    Snoring  STOP-BANG score of 4 with reported apneic episodes by patient's .  Sleep study referral placed.  - Adult Sleep Eval & Management  Referral      DO POPPY Pineda [facility]    Magali Mims is a 66 year old, presenting for the following health issues:  Rash (Insect bites?) and Insomnia (Sometimes stopped breathing at night)      History of Present Illness       Reason for visit:  Colonoscopy referral - diagnostic, insect bites and insomnia  Symptom onset:  More than a month  Symptoms include:  No notice for be, itchy bumps and can t fall asleep  Symptom intensity:  Severe  Symptom progression:  Staying the same  Had these symptoms before:  Yes  Has tried/received treatment for these symptoms:  No  What makes it worse:  No  What makes  it better:  No    She eats 2-3 servings of fruits and vegetables daily.She consumes 1 sweetened beverage(s) daily.She exercises with enough effort to increase her heart rate 20 to 29 minutes per day.  She exercises with enough effort to increase her heart rate 4 days per week.   She is taking medications regularly.     Loose stools: Chronically loose stools, has never had issues of constipation.  Difficult for her to go places due to concern for fecal incontinence and not making it to the bathroom.  Tends to come in waves and is often loose.  She may have mild cramping before having a BM but no significant abdominal pain precedes it.  She has BMs nearly daily, sometimes more than 1/day.  Using Imodium as needed but does not want to overdo this.  Father with history of diverticulitis.  Paternal grandfather with history of colon cancer.  No prior colon cancer screenings.  Denies bloody or black stools, unintentional weight loss.    Snoring:  Per patient's , with reported apneic episodes.  Also notes difficulty falling asleep at night.  Does not feel this is anxiety related but does feel she over thinks things making it hard to fall asleep.  Gabapentin has been helpful in the past.  Uses melatonin and Benadryl as needed.  Limits caffeine, nothing past 2 PM.  Has tried listening to meditation and podcasts with no significant success.    Snore: Do you snore loudly that can be heard through the door or over talking?Yes  Tired: Do you often feel tired,fatigued, sleepy during the day?Yes  Observed: Have others observed you gasping,choking or stopped breathing when sleeping?Yes  Pressure: Do you have high blood pressure?No  BMI: Is the BMI >35?No  Age: Is age >50?Yes  Neck: Is the neck > 17 inch (male) or >16 inch (female)?No  Gender: Is the patient male?No    Score Total yes:   Intermed risk: 3-4  High risk: 5-8    Insect bites:  Scattered on her shoulders, thighs, abdomen.  Pulls weeds in the garden nearly daily.   "Often wakes up in the morning with intense pruritus.  Some become swollen after scratching.  No other family members with similar lesions.        Objective    BP (!) 141/73   Pulse 74   Ht 1.715 m (5' 7.5\")   Wt 65.3 kg (144 lb)   BMI 22.22 kg/m    Body mass index is 22.22 kg/m .  Physical Exam   GENERAL: healthy, alert and no distress  RESP: lungs clear to auscultation - no rales, rhonchi or wheezes  CV: regular rate and rhythm, normal S1 S2, no S3 or S4, no murmur  ABDOMEN: soft, nontender, no hepatosplenomegaly, no masses and bowel sounds normal  PSYCH: mentation appears normal, affect normal/bright    Results for orders placed or performed in visit on 07/26/22 (from the past 24 hour(s))   CBC with platelets   Result Value Ref Range    WBC Count 4.3 4.0 - 11.0 10e3/uL    RBC Count 4.75 3.80 - 5.20 10e6/uL    Hemoglobin 13.1 11.7 - 15.7 g/dL    Hematocrit 40.8 35.0 - 47.0 %    MCV 86 78 - 100 fL    MCH 27.6 26.5 - 33.0 pg    MCHC 32.1 31.5 - 36.5 g/dL    RDW 13.7 10.0 - 15.0 %    Platelet Count 211 150 - 450 10e3/uL         .  ..  "

## 2022-07-26 NOTE — PATIENT INSTRUCTIONS
Health Tips:  - Create a daily schedule  - Eat Healthy  - Do at least one enjoyable activity each day  - Take medications as prescribed  - Get regular sleep at least 8 hours per night  - Practice relaxation  - Spend time with supportive people  - Helpful phone apps: [employer] Peng[employer] (for kids), Headspace, Calm, CBT-I, [place] (great for sleep), Breath to Relax

## 2022-07-28 ENCOUNTER — MYC MEDICAL ADVICE (OUTPATIENT)
Dept: FAMILY MEDICINE | Facility: CLINIC | Age: 67
End: 2022-07-28

## 2022-07-28 DIAGNOSIS — E78.2 MIXED HYPERLIPIDEMIA: Primary | ICD-10-CM

## 2022-07-30 RX ORDER — ROSUVASTATIN CALCIUM 5 MG/1
5 TABLET, COATED ORAL DAILY
Qty: 90 TABLET | Refills: 3 | Status: SHIPPED | OUTPATIENT
Start: 2022-07-30 | End: 2023-07-19

## 2022-08-01 PROBLEM — E04.1 THYROID NODULE: Status: ACTIVE | Noted: 2019-05-07

## 2022-08-01 PROBLEM — N32.81 OVERACTIVE BLADDER: Status: ACTIVE | Noted: 2019-05-07

## 2022-08-01 PROBLEM — E78.5 HYPERLIPIDEMIA: Chronic | Status: ACTIVE | Noted: 2021-08-03

## 2022-08-01 PROBLEM — Z85.42 HISTORY OF MALIGNANT NEOPLASM OF UTERINE BODY: Status: ACTIVE | Noted: 2021-08-03

## 2022-08-01 PROBLEM — M85.80 OSTEOPENIA: Status: ACTIVE | Noted: 2019-05-07

## 2022-08-01 PROBLEM — Z85.42 HISTORY OF MALIGNANT NEOPLASM OF UTERINE BODY: Chronic | Status: ACTIVE | Noted: 2021-08-03

## 2022-08-01 PROBLEM — N32.81 OVERACTIVE BLADDER: Chronic | Status: ACTIVE | Noted: 2019-05-07

## 2022-09-25 ENCOUNTER — HEALTH MAINTENANCE LETTER (OUTPATIENT)
Age: 67
End: 2022-09-25

## 2022-09-30 ENCOUNTER — TELEPHONE (OUTPATIENT)
Dept: FAMILY MEDICINE | Facility: CLINIC | Age: 67
End: 2022-09-30

## 2022-09-30 NOTE — TELEPHONE ENCOUNTER
Order/Referral Request    Who is requesting: Patient     Orders being requested: Referral to [facility] for Diagnostic Colonoscopy    Reason service is needed/diagnosis:   Provider recently put in a referral for this order and they are scheduling 9+ weeks out they suggested that she go to [facility] to get in sooner.  Patient tried calling [facility] and they told her that they cant schedule her with out a referral from provider.    When are orders needed by: ASAP    Has this been discussed with Provider: Yes    Does patient have a preference on a Group/Provider/Facility? [facility]    Where to send orders: Fax to [phone]    Could we send this information to you in [employer] or would you prefer to receive a phone call?:   Patient would prefer a phone call     Okay to leave a detailed message?: Yes at Cell number on file:    Telephone Information:   Mobile [phone]

## 2022-11-14 PROBLEM — E04.1 THYROID NODULE: Chronic | Status: ACTIVE | Noted: 2019-05-07

## 2022-11-14 PROBLEM — M85.80 OSTEOPENIA: Chronic | Status: ACTIVE | Noted: 2019-05-07

## 2022-11-15 ENCOUNTER — VIRTUAL VISIT (OUTPATIENT)
Dept: SLEEP MEDICINE | Facility: CLINIC | Age: 67
End: 2022-11-15
Attending: FAMILY MEDICINE
Payer: MEDICARE

## 2022-11-15 VITALS — WEIGHT: 144 LBS | BODY MASS INDEX: 21.82 KG/M2 | HEIGHT: 68 IN

## 2022-11-15 DIAGNOSIS — R06.83 SNORING: Primary | ICD-10-CM

## 2022-11-15 DIAGNOSIS — F51.04 CHRONIC INSOMNIA: Chronic | ICD-10-CM

## 2022-11-15 PROCEDURE — 99204 OFFICE O/P NEW MOD 45 MIN: CPT | Mod: 95 | Performed by: INTERNAL MEDICINE

## 2022-11-15 ASSESSMENT — SLEEP AND FATIGUE QUESTIONNAIRES
HOW LIKELY ARE YOU TO NOD OFF OR FALL ASLEEP WHILE SITTING INACTIVE IN A PUBLIC PLACE: WOULD NEVER DOZE
HOW LIKELY ARE YOU TO NOD OFF OR FALL ASLEEP WHILE LYING DOWN TO REST IN THE AFTERNOON WHEN CIRCUMSTANCES PERMIT: WOULD NEVER DOZE
HOW LIKELY ARE YOU TO NOD OFF OR FALL ASLEEP WHILE WATCHING TV: WOULD NEVER DOZE
HOW LIKELY ARE YOU TO NOD OFF OR FALL ASLEEP WHILE SITTING QUIETLY AFTER LUNCH WITHOUT ALCOHOL: WOULD NEVER DOZE
HOW LIKELY ARE YOU TO NOD OFF OR FALL ASLEEP IN A CAR, WHILE STOPPED FOR A FEW MINUTES IN TRAFFIC: WOULD NEVER DOZE
HOW LIKELY ARE YOU TO NOD OFF OR FALL ASLEEP WHEN YOU ARE A PASSENGER IN A CAR FOR AN HOUR WITHOUT A BREAK: WOULD NEVER DOZE
HOW LIKELY ARE YOU TO NOD OFF OR FALL ASLEEP WHILE SITTING AND READING: WOULD NEVER DOZE
HOW LIKELY ARE YOU TO NOD OFF OR FALL ASLEEP WHILE SITTING AND TALKING TO SOMEONE: WOULD NEVER DOZE

## 2022-11-15 NOTE — PROGRESS NOTES
Felicita is a 66 year old who is being evaluated via a billable video visit.      How would you like to obtain your AVS? MyChart  If the video visit is dropped, the invitation should be resent by: Send to e-mail at: [email or web address]  Will anyone else be joining your video visit? No        Video-Visit Details    Video Start Time: 1:02 PM    Type of service:  Video Visit    Video End Time:1:39 PM    Originating Location (pt. Location): Home        Distant Location (provider location):  Off-site    Platform used for Video Visit: [employer] [place]              Outpatient Sleep Medicine Consultation:      Name: Felicita Henderson MRN# [id number]   Age: 66 year old YOB: 1955     Date of Consultation: November 15, 2022  Consultation is requested by: Gregoria Michaels DO  [address] [place] [address] Gregoria Michaels  Primary care provider: Gregoria Michaels       Reason for Sleep Consult:     Felicita Henderson is sent by Gregoria Michaels for a sleep consultation regarding snoring .    Chief Complaint   Patient presents with     Consult     Concerned about health, poor energy             Assessment and Plan:     Occasional snoring, possible apneas vs soft breathing, daytime fatigue (ESS 0). Little to suggest significant obstructive sleep apnea  - Offered HST or Polysomnogram, will consider    Sleep onset difficulties (MARILIA 15)  Suspect psychophysiologic insomnia complicated by delayed sleep phase. We discussed stimulus control, sleep restriction and regular wake times.   - Read the book Say Good Night To Insomnia    - Consider referral for cognitive behavioral training       Summary Counseling:    Sleep Testing Reviewed  Obstructive Sleep Apnea Reviewed  Complications of Untreated Sleep Apnea Reviewed      I spent 35 minutes with patient including counseling, and 10 minutes with chart review, and documentation     CC: Gregoria Michaels          History of Present Illness:       SLEEP-WAKE SCHEDULE:      Work/School Days: Patient goes to school/work:   retitered  Usually gets into bed at  1030  Has trouble falling asleep most nights due to an over-active mind    Wakes up in the middle of the night 4 times (bathroom, uncertain)  She has trouble falling back asleep <1 times a week.   It usually takes   to get back to sleep  Patient is usually up at  830  Uses alarm:  no     Weekends/Non-work Days/All Other Days:  Schedule is similar     Patient states they do the following activities in bed:      Naps  Patient never takes a purposeful nap   She dozes off unintentionally 0 days per week  Patient has had a driving accident or near-miss due to sleepiness/drowsiness: No      SLEEP DISRUPTIONS:    Breathing/Snoring  Patient snores: Yes, some/occasional   Other people complain about her snoring:  occasiionally  Patient has been told she stops breathing in her sleep:  Yes  She has issues with the following:  No morning headaches, hearburn at night. Nocturia 1-2x    Movement:  Patient gets pain, discomfort, with an urge to move:   No  Patient has been told she kicks her legs at night:   No     Behaviors in Sleep:  Felicita Henderson has experienced the following behaviors while sleeping:    No dream enactment    Is there anything else you would like your sleep provider to know:      Uses electronic at times    CAFFEINE AND OTHER SUBSTANCES:    Patient consumes caffeinated beverages per day:   1 cup, 0-1 soda  Last caffeine use is usually:  2 pm    Family History:  Patient has a family member been diagnosed with a sleep disorder:  Father had obstructive sleep apnea             SCALES:    EPWORTH SLEEPINESS SCALE      [place] Sleepiness Scale ( SARAVANAN Alcantar  1990-1997[facility]/English - Final version - 21 Nov 07 - [facility] [place].) 11/15/2022   Sitting and reading Would never doze   Watching TV Would never doze   Sitting, inactive in a public place (e.g. a theatre or a meeting) Would never doze   As a passenger in a car  for an hour without a break Would never doze   Lying down to rest in the afternoon when circumstances permit Would never doze   Sitting and talking to someone Would never doze   Sitting quietly after a lunch without alcohol Would never doze   In a car, while stopped for a few minutes in traffic Would never doze   Biddle Score (MC) 0   Biddle Score (Sleep) 0         INSOMNIA SEVERITY INDEX (MARILIA)      Insomnia Severity Index (MARILIA) 11/15/2022   Difficulty falling asleep 3   Difficulty staying asleep 2   Problems waking up too early 1   How SATISFIED/DISSATISFIED are you with your CURRENT sleep pattern? 3   How NOTICEABLE to others do you think your sleep problem is in terms of impairing the quality of your life? 1   How WORRIED/DISTRESSED are you about your current sleep problem? 2   To what extent do you consider your sleep problem to INTERFERE with your daily functioning (e.g. daytime fatigue, mood, ability to function at work/daily chores, concentration, memory, mood, etc.) CURRENTLY? 3   MARILIA Total Score 15       Guidelines for Scoring/Interpretation:  Total score categories:  0-7 = No clinically significant insomnia   8-14 = Subthreshold insomnia   15-21 = Clinical insomnia (moderate severity)  22-28 = Clinical insomnia (severe)  Used via courtesy of www."[employer]th.va.gov with permission from Tl Lucas PhD., [facility]          Allergies:    No Known Allergies    Medications:    Current Outpatient Medications   Medication Sig Dispense Refill     ergocalciferol, vitamin D2, (VITAMIN D2 ORAL) [ERGOCALCIFEROL, VITAMIN D2, (VITAMIN D2 ORAL)] Take by mouth.       fish oil-omega-3 fatty acids (FISH OIL) 300-1,000 mg capsule [FISH OIL-OMEGA-3 FATTY ACIDS (FISH OIL) 300-1,000 MG CAPSULE] Take 2 g by mouth daily.       gabapentin (NEURONTIN) 300 MG capsule Take 1 capsule (300 mg) by mouth nightly as needed for other (sleep) 30 capsule 1     Melatonin 10 MG TABS tablet Take 10 mg by mouth nightly as needed for  sleep       MULTIVITAMIN ORAL [MULTIVITAMIN ORAL] Take by mouth.       oxybutynin ER (DITROPAN XL) 10 MG 24 hr tablet TAKE 1 TABLET BY MOUTH EVERY DAY 90 tablet 3     rosuvastatin (CRESTOR) 5 MG tablet Take 1 tablet (5 mg) by mouth daily 90 tablet 3       Problem List:  Patient Active Problem List    Diagnosis Date Noted     History of malignant neoplasm of uterine body 08/03/2021     Priority: High     s/p hysterectomy 4/2014       Hyperlipidemia 08/03/2021     Priority: Medium     Mitral valve prolapse      Priority: Low     No echo in EPIC       Overactive bladder 05/07/2019     Priority: Low     Osteopenia 05/07/2019     Priority: Low     DEXA 2018       Thyroid nodule 05/07/2019     Priority: Low     ultrasound 8/2021-  Left thyroid 4.3 cm TI-RADS 2 nodule. If this size compares to the 2015 study, no FNA recommended. If there is nodule enlargement          Past Medical/Surgical History:  Past Medical History:   Diagnosis Date     Endometrial cancer (H)     s/p hysterectomy 4/2014     Hypothyroidism      Obesity      Past Surgical History:   Procedure Laterality Date     BLADDER SUSPENSION  08/01/2010     HYSTERECTOMY  04/01/2014     WISDOM TOOTH EXTRACTION  1971       Social History:  Social History     Socioeconomic History     Marital status:      Spouse name: Not on file     Number of children: Not on file     Years of education: Not on file     Highest education level: Not on file   Occupational History     Occupation: Retired 2020 -  in [place]   Tobacco Use     Smoking status: Never     Smokeless tobacco: Never   Vaping Use     Vaping Use: Never used   Substance and Sexual Activity     Alcohol use: Yes     Comment: 1-2/week     Drug use: Not Currently     Sexual activity: Not on file   Other Topics Concern     Not on file   Social History Narrative     Not on file     Social Determinants of Health     Financial Resource Strain: Not on file   Food Insecurity: Not on file   Transportation  "Needs: Not on file   Physical Activity: Not on file   Stress: Not on file   Social Connections: Not on file   Intimate Partner Violence: Not on file   Housing Stability: Not on file       Family History:  Family History   Problem Relation Age of Onset     Hypertension Mother      Diabetes Type 1 Mother      Thyroid Cancer Mother      Alzheimer Disease Father      Diabetes Type 1 Brother        Review of Systems:  A complete review of systems reviewed by me is negative with the exeption of what has been mentioned in the history of present illness.    No night sweats    Physical Examination:  Vitals: Ht 1.715 m (5' 7.5\")   Wt 65.3 kg (144 lb)   BMI 22.22 kg/m    BMI= Body mass index is 22.22 kg/m .    SpO2 Readings from Last 4 Encounters:   No data found for SpO2       GENERAL APPEARANCE: alert and no distress  EYES: Eyes grossly normal to inspection  HENT: mouth without ulcers or lesions  NECK: not generous size  LUNGS: no shortness of breath , cough  NEURO: mentation intact, speech normal and cranial nerves 2-12 appear intact  PSYCH: affect normal/bright  Mallampati Class: 2            Data: All pertinent previous laboratory data reviewed     Recent Labs   Lab Test 04/19/21  1435 05/03/19  0000     --    POTASSIUM 3.7  --    CHLORIDE 107  --    CO2 25  --    ANIONGAP 9  --    GLC 89  --    BUN 13  --    CR 0.70 0.69   MARIANN 8.2*  --        Recent Labs   Lab Test 07/26/22  0820   WBC 4.3   RBC 4.75   HGB 13.1   HCT 40.8   MCV 86   MCH 27.6   MCHC 32.1   RDW 13.7          Recent Labs   Lab Test 05/03/19  0000   AST 17   ALT 16       TSH (uIU/mL)   Date Value   07/26/2022 3.30   04/19/2021 1.31         Oz Vicente MD 11/15/2022         "

## 2022-12-21 ENCOUNTER — TRANSFERRED RECORDS (OUTPATIENT)
Dept: HEALTH INFORMATION MANAGEMENT | Facility: CLINIC | Age: 67
End: 2022-12-21

## 2023-02-10 ENCOUNTER — HOSPITAL ENCOUNTER (OUTPATIENT)
Facility: HOSPITAL | Age: 68
End: 2023-02-10
Attending: SURGERY | Admitting: SURGERY
Payer: COMMERCIAL

## 2023-02-10 ENCOUNTER — SURGERY (OUTPATIENT)
Age: 68
End: 2023-02-10
Payer: COMMERCIAL

## 2023-02-10 ENCOUNTER — APPOINTMENT (OUTPATIENT)
Dept: CT IMAGING | Facility: HOSPITAL | Age: 68
End: 2023-02-10
Attending: EMERGENCY MEDICINE
Payer: COMMERCIAL

## 2023-02-10 ENCOUNTER — HOSPITAL ENCOUNTER (EMERGENCY)
Facility: HOSPITAL | Age: 68
Discharge: HOME | End: 2023-02-10
Attending: EMERGENCY MEDICINE | Admitting: SURGERY
Payer: COMMERCIAL

## 2023-02-10 ENCOUNTER — ANESTHESIA (OUTPATIENT)
Dept: SURGERY | Facility: HOSPITAL | Age: 68
End: 2023-02-10
Payer: COMMERCIAL

## 2023-02-10 ENCOUNTER — ANESTHESIA EVENT (OUTPATIENT)
Dept: SURGERY | Facility: HOSPITAL | Age: 68
End: 2023-02-10
Payer: COMMERCIAL

## 2023-02-10 VITALS
RESPIRATION RATE: 17 BRPM | DIASTOLIC BLOOD PRESSURE: 61 MMHG | WEIGHT: 140 LBS | HEART RATE: 70 BPM | OXYGEN SATURATION: 96 % | TEMPERATURE: 97.2 F | HEIGHT: 68 IN | SYSTOLIC BLOOD PRESSURE: 130 MMHG | BODY MASS INDEX: 21.22 KG/M2

## 2023-02-10 DIAGNOSIS — K35.209 ACUTE APPENDICITIS WITH GENERALIZED PERITONITIS, UNSPECIFIED WHETHER ABSCESS PRESENT, UNSPECIFIED WHETHER GANGRENE PRESENT, UNSPECIFIED WHETHER PERFORATION PRESENT: ICD-10-CM

## 2023-02-10 DIAGNOSIS — R94.31 QT PROLONGATION: ICD-10-CM

## 2023-02-10 DIAGNOSIS — G89.18 ACUTE POST-OPERATIVE PAIN: Primary | ICD-10-CM

## 2023-02-10 PROBLEM — K63.5 POLYP OF COLON: Status: ACTIVE | Noted: 2022-12-21

## 2023-02-10 PROBLEM — D12.2 BENIGN NEOPLASM OF ASCENDING COLON: Status: ACTIVE | Noted: 2022-12-23

## 2023-02-10 PROBLEM — R15.9 INCONTINENCE OF FECES: Status: ACTIVE | Noted: 2022-12-21

## 2023-02-10 PROBLEM — R19.7 DIARRHEA: Status: ACTIVE | Noted: 2022-12-21

## 2023-02-10 LAB
ALBUMIN SERPL BCG-MCNC: 4.5 G/DL (ref 3.5–5.2)
ALBUMIN UR-MCNC: 20 MG/DL
ALP SERPL-CCNC: 83 U/L (ref 35–104)
ALT SERPL W P-5'-P-CCNC: 24 U/L (ref 10–35)
ANION GAP SERPL CALCULATED.3IONS-SCNC: 14 MMOL/L (ref 7–15)
APPEARANCE UR: CLEAR
AST SERPL W P-5'-P-CCNC: 29 U/L (ref 10–35)
BASOPHILS # BLD AUTO: 0 10E3/UL (ref 0–0.2)
BASOPHILS NFR BLD AUTO: 0 %
BILIRUB DIRECT SERPL-MCNC: <0.2 MG/DL (ref 0–0.3)
BILIRUB SERPL-MCNC: 0.7 MG/DL
BILIRUB UR QL STRIP: NEGATIVE
BUN SERPL-MCNC: 12.5 MG/DL (ref 8–23)
CALCIUM SERPL-MCNC: 8.9 MG/DL (ref 8.8–10.2)
CHLORIDE SERPL-SCNC: 100 MMOL/L (ref 98–107)
COLOR UR AUTO: ABNORMAL
CREAT SERPL-MCNC: 0.64 MG/DL (ref 0.51–0.95)
DEPRECATED HCO3 PLAS-SCNC: 26 MMOL/L (ref 22–29)
EOSINOPHIL # BLD AUTO: 0 10E3/UL (ref 0–0.7)
EOSINOPHIL NFR BLD AUTO: 0 %
ERYTHROCYTE [DISTWIDTH] IN BLOOD BY AUTOMATED COUNT: 13.8 % (ref 10–15)
GFR SERPL CREATININE-BSD FRML MDRD: >90 ML/MIN/1.73M2
GLUCOSE SERPL-MCNC: 152 MG/DL (ref 70–99)
GLUCOSE UR STRIP-MCNC: NEGATIVE MG/DL
HCT VFR BLD AUTO: 44.3 % (ref 35–47)
HGB BLD-MCNC: 14.5 G/DL (ref 11.7–15.7)
HGB UR QL STRIP: NEGATIVE
IMM GRANULOCYTES # BLD: 0.1 10E3/UL
IMM GRANULOCYTES NFR BLD: 0 %
KETONES UR STRIP-MCNC: 80 MG/DL
LEUKOCYTE ESTERASE UR QL STRIP: ABNORMAL
LIPASE SERPL-CCNC: 16 U/L (ref 13–60)
LYMPHOCYTES # BLD AUTO: 0.6 10E3/UL (ref 0.8–5.3)
LYMPHOCYTES NFR BLD AUTO: 5 %
MCH RBC QN AUTO: 27.9 PG (ref 26.5–33)
MCHC RBC AUTO-ENTMCNC: 32.7 G/DL (ref 31.5–36.5)
MCV RBC AUTO: 85 FL (ref 78–100)
MONOCYTES # BLD AUTO: 0.5 10E3/UL (ref 0–1.3)
MONOCYTES NFR BLD AUTO: 4 %
MUCOUS THREADS #/AREA URNS LPF: PRESENT /LPF
NEUTROPHILS # BLD AUTO: 12.5 10E3/UL (ref 1.6–8.3)
NEUTROPHILS NFR BLD AUTO: 91 %
NITRATE UR QL: NEGATIVE
NRBC # BLD AUTO: 0 10E3/UL
NRBC BLD AUTO-RTO: 0 /100
PH UR STRIP: 6.5 [PH] (ref 5–7)
PLATELET # BLD AUTO: 235 10E3/UL (ref 150–450)
POTASSIUM SERPL-SCNC: 3.9 MMOL/L (ref 3.4–5.3)
PROT SERPL-MCNC: 7.6 G/DL (ref 6.4–8.3)
RBC # BLD AUTO: 5.2 10E6/UL (ref 3.8–5.2)
RBC URINE: 5 /HPF
SODIUM SERPL-SCNC: 140 MMOL/L (ref 136–145)
SP GR UR STRIP: >1.05 (ref 1–1.03)
SQUAMOUS EPITHELIAL: 1 /HPF
UROBILINOGEN UR STRIP-MCNC: <2 MG/DL
WBC # BLD AUTO: 13.6 10E3/UL (ref 4–11)
WBC URINE: 1 /HPF

## 2023-02-10 PROCEDURE — 250N000011 HC RX IP 250 OP 636: Performed by: EMERGENCY MEDICINE

## 2023-02-10 PROCEDURE — 85025 COMPLETE CBC W/AUTO DIFF WBC: CPT | Performed by: EMERGENCY MEDICINE

## 2023-02-10 PROCEDURE — 99204 OFFICE O/P NEW MOD 45 MIN: CPT | Mod: 57 | Performed by: SURGERY

## 2023-02-10 PROCEDURE — 272N000001 HC OR GENERAL SUPPLY STERILE: Performed by: SURGERY

## 2023-02-10 PROCEDURE — 250N000025 HC SEVOFLURANE, PER MIN: Performed by: SURGERY

## 2023-02-10 PROCEDURE — 74177 CT ABD & PELVIS W/CONTRAST: CPT

## 2023-02-10 PROCEDURE — 250N000009 HC RX 250: Performed by: NURSE ANESTHETIST, CERTIFIED REGISTERED

## 2023-02-10 PROCEDURE — 999N000141 HC STATISTIC PRE-PROCEDURE NURSING ASSESSMENT: Performed by: SURGERY

## 2023-02-10 PROCEDURE — 258N000003 HC RX IP 258 OP 636: Performed by: ANESTHESIOLOGY

## 2023-02-10 PROCEDURE — 83690 ASSAY OF LIPASE: CPT | Performed by: EMERGENCY MEDICINE

## 2023-02-10 PROCEDURE — 82310 ASSAY OF CALCIUM: CPT | Performed by: EMERGENCY MEDICINE

## 2023-02-10 PROCEDURE — 250N000011 HC RX IP 250 OP 636: Performed by: SURGERY

## 2023-02-10 PROCEDURE — 96374 THER/PROPH/DIAG INJ IV PUSH: CPT | Mod: 59

## 2023-02-10 PROCEDURE — 88304 TISSUE EXAM BY PATHOLOGIST: CPT | Mod: TC | Performed by: SURGERY

## 2023-02-10 PROCEDURE — 96361 HYDRATE IV INFUSION ADD-ON: CPT

## 2023-02-10 PROCEDURE — 44970 LAPAROSCOPY APPENDECTOMY: CPT | Performed by: SURGERY

## 2023-02-10 PROCEDURE — 93005 ELECTROCARDIOGRAM TRACING: CPT | Performed by: EMERGENCY MEDICINE

## 2023-02-10 PROCEDURE — 250N000011 HC RX IP 250 OP 636: Performed by: NURSE ANESTHETIST, CERTIFIED REGISTERED

## 2023-02-10 PROCEDURE — 99285 EMERGENCY DEPT VISIT HI MDM: CPT | Mod: 25

## 2023-02-10 PROCEDURE — 258N000003 HC RX IP 258 OP 636: Performed by: NURSE ANESTHETIST, CERTIFIED REGISTERED

## 2023-02-10 PROCEDURE — 258N000003 HC RX IP 258 OP 636: Performed by: EMERGENCY MEDICINE

## 2023-02-10 PROCEDURE — 80053 COMPREHEN METABOLIC PANEL: CPT | Performed by: EMERGENCY MEDICINE

## 2023-02-10 PROCEDURE — 360N000076 HC SURGERY LEVEL 3, PER MIN: Performed by: SURGERY

## 2023-02-10 PROCEDURE — 250N000009 HC RX 250: Performed by: SURGERY

## 2023-02-10 PROCEDURE — 96375 TX/PRO/DX INJ NEW DRUG ADDON: CPT | Mod: 59

## 2023-02-10 PROCEDURE — 82248 BILIRUBIN DIRECT: CPT | Performed by: EMERGENCY MEDICINE

## 2023-02-10 PROCEDURE — 36415 COLL VENOUS BLD VENIPUNCTURE: CPT | Performed by: EMERGENCY MEDICINE

## 2023-02-10 PROCEDURE — 370N000017 HC ANESTHESIA TECHNICAL FEE, PER MIN: Performed by: SURGERY

## 2023-02-10 PROCEDURE — 81001 URINALYSIS AUTO W/SCOPE: CPT | Performed by: EMERGENCY MEDICINE

## 2023-02-10 PROCEDURE — 710N000012 HC RECOVERY PHASE 2, PER MINUTE: Performed by: SURGERY

## 2023-02-10 PROCEDURE — 710N000010 HC RECOVERY PHASE 1, LEVEL 2, PER MIN: Performed by: SURGERY

## 2023-02-10 RX ORDER — CEFAZOLIN SODIUM/WATER 2 G/20 ML
2 SYRINGE (ML) INTRAVENOUS
Status: COMPLETED | OUTPATIENT
Start: 2023-02-10 | End: 2023-02-10

## 2023-02-10 RX ORDER — NALOXONE HYDROCHLORIDE 0.4 MG/ML
0.4 INJECTION, SOLUTION INTRAMUSCULAR; INTRAVENOUS; SUBCUTANEOUS
Status: DISCONTINUED | OUTPATIENT
Start: 2023-02-10 | End: 2023-02-10 | Stop reason: HOSPADM

## 2023-02-10 RX ORDER — ONDANSETRON 2 MG/ML
4 INJECTION INTRAMUSCULAR; INTRAVENOUS ONCE
Status: COMPLETED | OUTPATIENT
Start: 2023-02-10 | End: 2023-02-10

## 2023-02-10 RX ORDER — ONDANSETRON 2 MG/ML
4 INJECTION INTRAMUSCULAR; INTRAVENOUS EVERY 30 MIN PRN
Status: DISCONTINUED | OUTPATIENT
Start: 2023-02-10 | End: 2023-02-10 | Stop reason: HOSPADM

## 2023-02-10 RX ORDER — FENTANYL CITRATE 50 UG/ML
50 INJECTION, SOLUTION INTRAMUSCULAR; INTRAVENOUS EVERY 5 MIN PRN
Status: DISCONTINUED | OUTPATIENT
Start: 2023-02-10 | End: 2023-02-10 | Stop reason: HOSPADM

## 2023-02-10 RX ORDER — SODIUM CHLORIDE, SODIUM LACTATE, POTASSIUM CHLORIDE, CALCIUM CHLORIDE 600; 310; 30; 20 MG/100ML; MG/100ML; MG/100ML; MG/100ML
INJECTION, SOLUTION INTRAVENOUS CONTINUOUS
Status: DISCONTINUED | OUTPATIENT
Start: 2023-02-10 | End: 2023-02-10 | Stop reason: HOSPADM

## 2023-02-10 RX ORDER — OXYCODONE HYDROCHLORIDE 5 MG/1
10 TABLET ORAL EVERY 4 HOURS PRN
Status: DISCONTINUED | OUTPATIENT
Start: 2023-02-10 | End: 2023-02-10 | Stop reason: HOSPADM

## 2023-02-10 RX ORDER — LIDOCAINE 40 MG/G
CREAM TOPICAL
Status: DISCONTINUED | OUTPATIENT
Start: 2023-02-10 | End: 2023-02-10 | Stop reason: HOSPADM

## 2023-02-10 RX ORDER — PROPOFOL 10 MG/ML
INJECTION, EMULSION INTRAVENOUS CONTINUOUS PRN
Status: DISCONTINUED | OUTPATIENT
Start: 2023-02-10 | End: 2023-02-10

## 2023-02-10 RX ORDER — NALOXONE HYDROCHLORIDE 0.4 MG/ML
0.2 INJECTION, SOLUTION INTRAMUSCULAR; INTRAVENOUS; SUBCUTANEOUS
Status: DISCONTINUED | OUTPATIENT
Start: 2023-02-10 | End: 2023-02-10 | Stop reason: HOSPADM

## 2023-02-10 RX ORDER — OXYCODONE HYDROCHLORIDE 5 MG/1
5 TABLET ORAL EVERY 4 HOURS PRN
Status: DISCONTINUED | OUTPATIENT
Start: 2023-02-10 | End: 2023-02-10 | Stop reason: HOSPADM

## 2023-02-10 RX ORDER — ONDANSETRON 4 MG/1
4 TABLET, ORALLY DISINTEGRATING ORAL EVERY 30 MIN PRN
Status: DISCONTINUED | OUTPATIENT
Start: 2023-02-10 | End: 2023-02-10 | Stop reason: HOSPADM

## 2023-02-10 RX ORDER — FENTANYL CITRATE 50 UG/ML
25 INJECTION, SOLUTION INTRAMUSCULAR; INTRAVENOUS EVERY 5 MIN PRN
Status: DISCONTINUED | OUTPATIENT
Start: 2023-02-10 | End: 2023-02-10 | Stop reason: HOSPADM

## 2023-02-10 RX ORDER — HYDROMORPHONE HYDROCHLORIDE 1 MG/ML
0.2 INJECTION, SOLUTION INTRAMUSCULAR; INTRAVENOUS; SUBCUTANEOUS EVERY 5 MIN PRN
Status: DISCONTINUED | OUTPATIENT
Start: 2023-02-10 | End: 2023-02-10 | Stop reason: HOSPADM

## 2023-02-10 RX ORDER — HYDROMORPHONE HYDROCHLORIDE 1 MG/ML
0.5 INJECTION, SOLUTION INTRAMUSCULAR; INTRAVENOUS; SUBCUTANEOUS
Status: DISCONTINUED | OUTPATIENT
Start: 2023-02-10 | End: 2023-02-10 | Stop reason: HOSPADM

## 2023-02-10 RX ORDER — HYDROMORPHONE HYDROCHLORIDE 1 MG/ML
0.4 INJECTION, SOLUTION INTRAMUSCULAR; INTRAVENOUS; SUBCUTANEOUS EVERY 5 MIN PRN
Status: DISCONTINUED | OUTPATIENT
Start: 2023-02-10 | End: 2023-02-10 | Stop reason: HOSPADM

## 2023-02-10 RX ORDER — HYDROCODONE BITARTRATE AND ACETAMINOPHEN 5; 325 MG/1; MG/1
1-2 TABLET ORAL
Status: DISCONTINUED | OUTPATIENT
Start: 2023-02-10 | End: 2023-02-10 | Stop reason: HOSPADM

## 2023-02-10 RX ORDER — ONDANSETRON 2 MG/ML
INJECTION INTRAMUSCULAR; INTRAVENOUS PRN
Status: DISCONTINUED | OUTPATIENT
Start: 2023-02-10 | End: 2023-02-10

## 2023-02-10 RX ORDER — PIPERACILLIN SODIUM, TAZOBACTAM SODIUM 3; .375 G/15ML; G/15ML
3.38 INJECTION, POWDER, LYOPHILIZED, FOR SOLUTION INTRAVENOUS ONCE
Status: COMPLETED | OUTPATIENT
Start: 2023-02-10 | End: 2023-02-10

## 2023-02-10 RX ORDER — HYDROCODONE BITARTRATE AND ACETAMINOPHEN 5; 325 MG/1; MG/1
1-2 TABLET ORAL EVERY 4 HOURS PRN
Qty: 12 TABLET | Refills: 0 | Status: SHIPPED | OUTPATIENT
Start: 2023-02-10 | End: 2023-02-20

## 2023-02-10 RX ORDER — LIDOCAINE HYDROCHLORIDE 10 MG/ML
INJECTION, SOLUTION INFILTRATION; PERINEURAL PRN
Status: DISCONTINUED | OUTPATIENT
Start: 2023-02-10 | End: 2023-02-10

## 2023-02-10 RX ORDER — CEFAZOLIN SODIUM/WATER 2 G/20 ML
2 SYRINGE (ML) INTRAVENOUS SEE ADMIN INSTRUCTIONS
Status: DISCONTINUED | OUTPATIENT
Start: 2023-02-10 | End: 2023-02-10 | Stop reason: HOSPADM

## 2023-02-10 RX ORDER — SODIUM CHLORIDE 9 MG/ML
INJECTION, SOLUTION INTRAVENOUS ONCE
Status: COMPLETED | OUTPATIENT
Start: 2023-02-10 | End: 2023-02-10

## 2023-02-10 RX ORDER — IBUPROFEN 600 MG/1
600 TABLET, FILM COATED ORAL EVERY 6 HOURS PRN
Qty: 30 TABLET | Refills: 0 | COMMUNITY
Start: 2023-02-10

## 2023-02-10 RX ORDER — PROPOFOL 10 MG/ML
INJECTION, EMULSION INTRAVENOUS PRN
Status: DISCONTINUED | OUTPATIENT
Start: 2023-02-10 | End: 2023-02-10

## 2023-02-10 RX ORDER — IOPAMIDOL 755 MG/ML
100 INJECTION, SOLUTION INTRAVASCULAR ONCE
Status: COMPLETED | OUTPATIENT
Start: 2023-02-10 | End: 2023-02-10

## 2023-02-10 RX ORDER — HEPARIN SODIUM 5000 [USP'U]/.5ML
5000 INJECTION, SOLUTION INTRAVENOUS; SUBCUTANEOUS
Status: COMPLETED | OUTPATIENT
Start: 2023-02-10 | End: 2023-02-10

## 2023-02-10 RX ORDER — KETOROLAC TROMETHAMINE 15 MG/ML
15 INJECTION, SOLUTION INTRAMUSCULAR; INTRAVENOUS ONCE
Status: COMPLETED | OUTPATIENT
Start: 2023-02-10 | End: 2023-02-10

## 2023-02-10 RX ORDER — FENTANYL CITRATE 50 UG/ML
INJECTION, SOLUTION INTRAMUSCULAR; INTRAVENOUS PRN
Status: DISCONTINUED | OUTPATIENT
Start: 2023-02-10 | End: 2023-02-10

## 2023-02-10 RX ORDER — DEXAMETHASONE SODIUM PHOSPHATE 10 MG/ML
INJECTION, SOLUTION INTRAMUSCULAR; INTRAVENOUS PRN
Status: DISCONTINUED | OUTPATIENT
Start: 2023-02-10 | End: 2023-02-10

## 2023-02-10 RX ORDER — BUPIVACAINE HYDROCHLORIDE AND EPINEPHRINE 2.5; 5 MG/ML; UG/ML
INJECTION, SOLUTION INFILTRATION; PERINEURAL PRN
Status: DISCONTINUED | OUTPATIENT
Start: 2023-02-10 | End: 2023-02-10 | Stop reason: HOSPADM

## 2023-02-10 RX ADMIN — ONDANSETRON 4 MG: 2 INJECTION INTRAMUSCULAR; INTRAVENOUS at 14:33

## 2023-02-10 RX ADMIN — SODIUM CHLORIDE, POTASSIUM CHLORIDE, SODIUM LACTATE AND CALCIUM CHLORIDE: 600; 310; 30; 20 INJECTION, SOLUTION INTRAVENOUS at 15:34

## 2023-02-10 RX ADMIN — ONDANSETRON 4 MG: 2 INJECTION INTRAMUSCULAR; INTRAVENOUS at 08:08

## 2023-02-10 RX ADMIN — PIPERACILLIN AND TAZOBACTAM 3.38 G: 3; .375 INJECTION, POWDER, LYOPHILIZED, FOR SOLUTION INTRAVENOUS at 10:34

## 2023-02-10 RX ADMIN — PROPOFOL 50 MCG/KG/MIN: 10 INJECTION, EMULSION INTRAVENOUS at 14:27

## 2023-02-10 RX ADMIN — FENTANYL CITRATE 100 MCG: 50 INJECTION, SOLUTION INTRAMUSCULAR; INTRAVENOUS at 14:24

## 2023-02-10 RX ADMIN — HYDROMORPHONE HYDROCHLORIDE 0.5 MG: 1 INJECTION, SOLUTION INTRAMUSCULAR; INTRAVENOUS; SUBCUTANEOUS at 13:18

## 2023-02-10 RX ADMIN — PROPOFOL 120 MG: 10 INJECTION, EMULSION INTRAVENOUS at 14:24

## 2023-02-10 RX ADMIN — PHENYLEPHRINE HYDROCHLORIDE 100 MCG: 10 INJECTION INTRAVENOUS at 14:25

## 2023-02-10 RX ADMIN — HEPARIN SODIUM 5000 UNITS: 5000 INJECTION, SOLUTION INTRAVENOUS; SUBCUTANEOUS at 14:49

## 2023-02-10 RX ADMIN — ROCURONIUM BROMIDE 40 MG: 50 INJECTION, SOLUTION INTRAVENOUS at 14:25

## 2023-02-10 RX ADMIN — BUPIVACAINE HYDROCHLORIDE AND EPINEPHRINE BITARTRATE 21 ML: 2.5; .005 INJECTION, SOLUTION INFILTRATION; PERINEURAL at 14:41

## 2023-02-10 RX ADMIN — IOPAMIDOL 100 ML: 755 INJECTION, SOLUTION INTRAVENOUS at 08:58

## 2023-02-10 RX ADMIN — KETOROLAC TROMETHAMINE 15 MG: 15 INJECTION, SOLUTION INTRAMUSCULAR; INTRAVENOUS at 08:03

## 2023-02-10 RX ADMIN — SODIUM CHLORIDE, POTASSIUM CHLORIDE, SODIUM LACTATE AND CALCIUM CHLORIDE: 600; 310; 30; 20 INJECTION, SOLUTION INTRAVENOUS at 14:03

## 2023-02-10 RX ADMIN — LIDOCAINE HYDROCHLORIDE 3 ML: 10 INJECTION, SOLUTION INFILTRATION; PERINEURAL at 14:24

## 2023-02-10 RX ADMIN — PHENYLEPHRINE HYDROCHLORIDE 200 MCG: 10 INJECTION INTRAVENOUS at 14:36

## 2023-02-10 RX ADMIN — Medication 2 G: at 14:49

## 2023-02-10 RX ADMIN — SUGAMMADEX 200 MG: 100 INJECTION, SOLUTION INTRAVENOUS at 15:22

## 2023-02-10 RX ADMIN — DEXAMETHASONE SODIUM PHOSPHATE 10 MG: 10 INJECTION, SOLUTION INTRAMUSCULAR; INTRAVENOUS at 14:25

## 2023-02-10 RX ADMIN — MIDAZOLAM 2 MG: 1 INJECTION INTRAMUSCULAR; INTRAVENOUS at 14:14

## 2023-02-10 RX ADMIN — BUPIVACAINE HYDROCHLORIDE AND EPINEPHRINE BITARTRATE 29 ML: 2.5; .005 INJECTION, SOLUTION INFILTRATION; PERINEURAL at 15:20

## 2023-02-10 RX ADMIN — SODIUM CHLORIDE: 9 INJECTION, SOLUTION INTRAVENOUS at 08:00

## 2023-02-10 ASSESSMENT — ACTIVITIES OF DAILY LIVING (ADL)
ADLS_ACUITY_SCORE: 35

## 2023-02-10 ASSESSMENT — ENCOUNTER SYMPTOMS
CONSTIPATION: 0
NAUSEA: 1
ABDOMINAL PAIN: 1
FREQUENCY: 0
DYSURIA: 0

## 2023-02-10 NOTE — ANESTHESIA PREPROCEDURE EVALUATION
Anesthesia Pre-Procedure Evaluation    Patient: Felicita Henderson   MRN: [id number] : 1955        Procedure : Procedure(s):  APPENDECTOMY, LAPAROSCOPIC          Past Medical History:   Diagnosis Date     Endometrial cancer (H)     s/p hysterectomy 2014     Hypothyroidism      Obesity       Past Surgical History:   Procedure Laterality Date     BLADDER SUSPENSION  2010     HYSTERECTOMY  2014     WISDOM TOOTH EXTRACTION  1971      No Known Allergies   Social History     Tobacco Use     Smoking status: Never     Smokeless tobacco: Never   Substance Use Topics     Alcohol use: Yes     Comment: 1-2/week      Wt Readings from Last 1 Encounters:   02/10/23 63.5 kg (140 lb)        Anesthesia Evaluation   Pt has had prior anesthetic.     History of anesthetic complications (slow wake up)       ROS/MED HX  ENT/Pulmonary:  - neg pulmonary ROS     Neurologic:  - neg neurologic ROS     Cardiovascular:     (+) Dyslipidemia -----valvular problems/murmurs type: MVP  (-) WEBB   METS/Exercise Tolerance: >4 METS    Hematologic:  - neg hematologic  ROS     Musculoskeletal:  - neg musculoskeletal ROS     GI/Hepatic:     (+) appendicitis,     Renal/Genitourinary:  - neg Renal ROS     Endo:     (+) thyroid problem,  (-) obesity   Psychiatric/Substance Use:  - neg psychiatric ROS     Infectious Disease:  - neg infectious disease ROS     Malignancy:  - neg malignancy ROS     Other:  - neg other ROS          Physical Exam    Airway  airway exam normal      Mallampati: II   TM distance: > 3 FB   Neck ROM: full   Mouth opening: > 3 cm    Respiratory Devices and Support         Dental           Cardiovascular   cardiovascular exam normal          Pulmonary   pulmonary exam normal                OUTSIDE LABS:  CBC:   Lab Results   Component Value Date    WBC 13.6 (H) 02/10/2023    WBC 4.3 2022    HGB 14.5 02/10/2023    HGB 13.1 2022    HCT 44.3 02/10/2023    HCT 40.8 2022     02/10/2023      07/26/2022     BMP:   Lab Results   Component Value Date     02/10/2023     04/19/2021    POTASSIUM 3.9 02/10/2023    POTASSIUM 3.7 04/19/2021    CHLORIDE 100 02/10/2023    CHLORIDE 107 04/19/2021    CO2 26 02/10/2023    CO2 25 04/19/2021    BUN 12.5 02/10/2023    BUN 13 04/19/2021    CR 0.64 02/10/2023    CR 0.70 04/19/2021     (H) 02/10/2023    GLC 89 04/19/2021     COAGS: No results found for: PTT, INR, FIBR  POC: No results found for: BGM, HCG, HCGS  HEPATIC:   Lab Results   Component Value Date    ALBUMIN 4.5 02/10/2023    PROTTOTAL 7.6 02/10/2023    ALT 24 02/10/2023    AST 29 02/10/2023    ALKPHOS 83 02/10/2023    BILITOTAL 0.7 02/10/2023     OTHER:   Lab Results   Component Value Date    MARIANN 8.9 02/10/2023    LIPASE 16 02/10/2023    TSH 3.30 07/26/2022       Anesthesia Plan    ASA Status:  2, emergent    NPO Status:  NPO Appropriate    Anesthesia Type: General.     - Airway: ETT   Induction: Intravenous, Propofol.   Maintenance: Balanced.        Consents    Anesthesia Plan(s) and associated risks, benefits, and realistic alternatives discussed. Questions answered and patient/representative(s) expressed understanding.    - Discussed:     - Discussed with:  Patient      - Extended Intubation/Ventilatory Support Discussed: No.      - Patient is DNR/DNI Status: No    Use of blood products discussed: No .     Postoperative Care    Pain management: IV analgesics, Multi-modal analgesia.   PONV prophylaxis: Ondansetron (or other 5HT-3), Dexamethasone or Solumedrol, Droperidol or Haldol     Comments:                Campos Ferguson MD

## 2023-02-10 NOTE — OP NOTE
Operative Note    Name:  Felicita Henderson  Location: [facility] Main OR  Procedure Date:  2/10/2023  PCP:  Gregoria Michaels    Surgery Performed:  Procedure/Surgery Information   Procedure: Procedure(s):  APPENDECTOMY, LAPAROSCOPIC, LYSIS OF ADHESIONS   Surgeon(s): Surgeon(s) and Role:     * Emeka Velasquez MD - Primary   Specimens: ID Type Source Tests Collected by Time Destination   1 :  Tissue Appendix SURGICAL PATHOLOGY EXAM Emeka Velasquez MD 2/10/2023  3:10 PM                Pre-Procedure Diagnosis:  Acute appendicitis with generalized peritonitis, unspecified whether abscess present, unspecified whether gangrene present, unspecified whether perforation present [K35.20]    Post-Procedure Diagnosis:    Acute appendicitis with generalized peritonitis, unspecified whether abscess present, unspecified whether gangrene present, unspecified whether perforation present [K35.20]    Anesthesia:  General     Past Medical History:   Diagnosis Date     Endometrial cancer (H)     s/p hysterectomy 4/2014     Hypothyroidism      Obesity        Patient Active Problem List    Diagnosis Date Noted     Benign neoplasm of ascending colon 12/23/2022     Priority: Medium     Diarrhea 12/21/2022     Priority: Medium     Incontinence of feces 12/21/2022     Priority: Medium     Polyp of colon 12/21/2022     Priority: Medium     Chronic insomnia 11/15/2022     Priority: Medium     History of malignant neoplasm of uterine body 08/03/2021     Priority: Medium     s/p hysterectomy 4/2014       Hyperlipidemia 08/03/2021     Priority: Medium     Mitral valve prolapse      Priority: Medium     No echo in EPIC       Overactive bladder 05/07/2019     Priority: Medium     Osteopenia 05/07/2019     Priority: Medium     DEXA 2018       Thyroid nodule 05/07/2019     Priority: Medium     ultrasound 8/2021-  Left thyroid 4.3 cm TI-RADS 2 nodule. If this size compares to the 2015 study, no FNA recommended. If there is nodule  enlargement         Findings:  Intra-abdominal adhesions all through the central abdomen  Very inflamed appendix adhesed down into the right hemipelvis    Operative Report:    Patient is brought to the operating room placed in supine position given general endotracheal anesthesia sterilely prepped and draped in the usual surgical fashion and a timeout procedure was undertaken.     The infraumbilical space was infused with local anesthetic and a 5 mm incisions made an Optiview trocar was used to advance the trocar and laparoscope into the intra-abdominal space under direct visualization of 0 degree laparoscope.  Pneumoperitoneum was brought up to 15 mmHg.  There was a free window to the open intra-abdominal space but this trocar was surrounded by considerable number of adhesions.  I put the second trocar in the upper midline under direct visualization of the laparoscope and the third trocar in the left lower quadrant under direct visualization of the laparoscope from there is able to take down the adhesions all through the central abdomen with the LigaSure device.     The cecum and appendix are identified, and the appendix was coming off of the posterior lateral aspect of the cecum and adhesed down along the right lateral pelvic sidewall.  It was carefully dissected free from the surrounding structures before I could finally free up the tip of the appendix once I had the tip of the appendix was able to divide down the mesoappendix with the LigaSure device and skeletonized the appendix at the appendiceal takeoff from the cecum.        2 Endoloops were placed at the takeoff of the appendix.  The appendix was then divided with use of electrocautery and the laparoscopic scissors.  Cautery was applied to the appendiceal mucosa upon removal of the appendix.  The appendix was placed in an Endo Catch bag and taken out through the lower of the 3 trocar sites.  The trocar site was then closed at the level of the fascia with a  0 Vicryl suture using an Endo fascial closure device under direct visualization of the laparoscope.     The patient had a tap block placed under direct visualization of the laparoscope infusing the local anesthetic in the plane between the internal oblique and the transversus abdominis muscle.  This was done all along the right side of the patient.     The pneumoperitoneum was evacuated and the trochars were removed.  The skin incisions were closed with 4-0 subcuticular Monocryl sutures and the wounds are closed with Telfa and Tegaderm.     Patient is extubated and taken to recovery    Estimated Blood Loss:   5 cc       Drains:        Implants:  * No implants in log *    Complications:    None    Emeka Velasquez MD     Date: 2/10/2023  Time: 3:38 PM

## 2023-02-10 NOTE — CONSULTS
I agree with the evaluation from Dr. Matthew Dia.    This is a 67-year-old patient that presents with abdominal pain and on CT scan has evidence of an acute appendicitis with a fecalith at the base of the appendix.  I would recommend this patient has a laparoscopic appendectomy to be performed today.    Emeka MCWILLIAMS-[facility]  [phone]

## 2023-02-10 NOTE — ED PROVIDER NOTES
EMERGENCY DEPARTMENT ENCOUNTER      NAME: Felicita Henderson  AGE: 67 year old female  YOB: 1955  MRN: [id number]  EVALUATION DATE & TIME: 2/10/2023  7:35 AM    PCP: Gregoria Michaels    ED PROVIDER: Eagle Charlton M.D.      Chief Complaint   Patient presents with     Abdominal Pain         FINAL IMPRESSION:  1. Acute appendicitis with generalized peritonitis, unspecified whether abscess present, unspecified whether gangrene present, unspecified whether perforation present    2. QT prolongation          ED COURSE & MEDICAL DECISION MAKING:    Pertinent Labs & Imaging studies reviewed. (See chart for details)  ED Course as of 02/10/23 1012   Fri Feb 10, 2023   0747 Patient is a 67-year-old woman with history of kidney stones, status post hysterectomy, bilateral oophorectomy, here with abdominal pain that began vomiting couple days ago, now much more persistent throughout her entire abdomen.  No bowel or bladder symptoms.  On arrival she is uncomfortable, mildly hypertensive, but afebrile with normal heart rate.  Not septic appearing.  She has some tenderness that spares the right upper quadrant, so cholecystitis is less likely, however appendicitis, diverticulitis, small bowel obstruction is in the differential.  History of kidney stones, but this would be somewhat atypical for kidney stone pain, CT scan will help evaluate this.  Lab work, urinalysis ordered.  IV fluids, Toradol ordered.  As needed Dilaudid for breakthrough pain.   0820 WBC(!): 13.6   0840 Lipase: 16   0841 Creatinine: 0.64   0841 Hepatic function panel  WNL   0916 Please see my interpretation of the CT, it appears as though it appears large stool burden in the cecum, appendicitis with fecalith.  Keep pt NPO. I will wait for radiology to confirm this before notifying surgery   0929 CT Abdomen Pelvis w Contrast  1.  Acute uncomplicated appendicitis. There are several appendicoliths including an 8 mm appendicolith at the base of the  appendix.  2.  Left nephrolithiasis   0976 I spoke to the patient, discussed plan for Zosyn, likely OR.  General surgery was paged.  Preop screening EKG ordered.   0981 I spoke to Dr. Dia who has seen the patient, and will take her to the operating room, discharge from postop.   1009 CV screening EKG shows normal sinus rhythm with a rate of 68.  No acute ischemic ST or T wave morphology.  Nonspecific septal T wave inversions.  Normal axis, QT prolongation and 46 ms.  When compared to prior EKG on August 3, 2021, QT prolongation is new.  Impression: Normal sinus rhythm, QT prolongation.         Additional ED Course Timestamps:  7:47 AM I met with the patient, obtained history, performed an initial exam, and discussed options and plan for diagnostics and treatment here in the ED.      Medical Decision Making    History:    Supplemental history from: Documented in chart, if applicable    External Record(s) reviewed: Documented in chart, if applicable.    Work Up:    Chart documentation includes differential considered and any EKGs or imaging independently interpreted by provider, where specified.    In additional to work up documented, I considered the following work up: Documented in chart, if applicable.    External consultation:    Discussion of management with another provider: Documented in chart, if applicable    Complicating factors:    Care impacted by chronic illness: N/A    Care affected by social determinants of health: N/A    Disposition considerations: Sent to the ER with general surgery who will likely discharge after recovery        At the conclusion of the encounter I discussed the results of all of the tests and the disposition. The questions were answered. The patient or family acknowledged understanding and was agreeable with the care plan.       30 minutes of critical care time for acute appendicitis without rupture requiring prompt IV antibiotics, surgery consultation and same day operation. This  "excludes time spent performing interventions or procedures.    MEDICATIONS GIVEN IN THE EMERGENCY:  Medications   HYDROmorphone (PF) (DILAUDID) injection 0.5 mg (has no administration in time range)   piperacillin-tazobactam (ZOSYN) 3.375 g vial to attach to  mL bag (has no administration in time range)   ketorolac (TORADOL) injection 15 mg (15 mg Intravenous Given 2/10/23 0803)   sodium chloride 0.9% infusion ( Intravenous New Bag 2/10/23 0800)   ondansetron (ZOFRAN) injection 4 mg (4 mg Intravenous Given 2/10/23 0808)   iopamidol (ISOVUE-370) solution 100 mL (100 mLs Intravenous Given 2/10/23 0858)         NEW PRESCRIPTIONS STARTED AT TODAY'S ER VISIT  New Prescriptions    No medications on file          =================================================================    HPI    Patient information was obtained from: patient    Use of : N/A       Felicita Henderson is a 67 year old female with a pertinent history of endometrial cancer, hypothyroidism, HLD,  who presents to this ED for evaluation of abdominal pain.    Patient reports mild abdominal pain that started 2 days ago. States her pain worsened and was 8/10 last night. States her pain is currently 3-4/10. Endorses nausea. States her abdominal pain started in the right side and is now across her entire abdomen. States she last had food at 8 pm last night. States she has a history of a hysterectomy and kidney stones. Denies constipation, dysuria, frequency or any other medical complaint at this time.                REVIEW OF SYSTEMS   Review of Systems   Gastrointestinal: Positive for abdominal pain and nausea. Negative for constipation.   Genitourinary: Negative for dysuria and frequency.   All other systems reviewed and are negative.       VITALS:  /60   Pulse 67   Temp 98.4  F (36.9  C) (Oral)   Resp 16   Ht 1.727 m (5' 8\")   Wt 63.5 kg (140 lb)   SpO2 97%   BMI 21.29 kg/m      PHYSICAL EXAM    Constitutional: Well developed, " well nourished. Uncomfortable appearing.  HENT: Normocephalic, atraumatic, mucous membranes moist, nose normal. Neck- Supple, gross ROM intact.   Eyes: Pupils mid-range, conjunctiva without injection, no discharge.   Respiratory: Clear to auscultation bilaterally, no respiratory distress, no wheezing, speaks full sentences easily. No cough.  Cardiovascular: Normal heart rate, regular rhythm, no murmurs.   GI: Soft, no masses. Tenderness to LUQ, LLQ and RLQ  Musculoskeletal: Moving all 4 extremities intentionally and without pain. No obvious deformity.  Skin: Warm, dry, no rash.  Neurologic: Alert & oriented x 3, cranial nerves grossly intact.  Psychiatric: Affect normal, cooperative.      I, Reji Davis am serving as a scribe to document services personally performed by Dr. Eagle Charlton based on my observation and the provider's statements to me. I, Eagle Charlton MD attest that Reji Davis is acting in a scribe capacity, has observed my performance of the services and has documented them in accordance with my direction.    Eagle Charlton M.D.  Emergency Medicine  [facility] EMERGENCY DEPARTMENT  [facility] [facility] [id number] [phone]  Dept: [phone]       Eagle Charlton MD  02/10/23 1013

## 2023-02-10 NOTE — PHARMACY-ADMISSION MEDICATION HISTORY
Pharmacy Note - Admission Medication History    Pertinent Provider Information:      ______________________________________________________________________    Prior To Admission (PTA) med list completed and updated in EMR.       PTA Med List   Medication Sig Last Dose     fish oil-omega-3 fatty acids (FISH OIL) 300-1,000 mg capsule [FISH OIL-OMEGA-3 FATTY ACIDS (FISH OIL) 300-1,000 MG CAPSULE] Take 2 g by mouth daily. Past Month     gabapentin (NEURONTIN) 300 MG capsule Take 1 capsule (300 mg) by mouth nightly as needed for other (sleep) More than a month     MULTIVITAMIN ORAL Take 1 tablet by mouth daily Past Week     oxybutynin ER (DITROPAN XL) 10 MG 24 hr tablet TAKE 1 TABLET BY MOUTH EVERY DAY 2/9/2023 at [facility]     rosuvastatin (CRESTOR) 5 MG tablet Take 1 tablet (5 mg) by mouth daily 2/9/2023 at [facility]       [facility]  Method of interview communication: in-person    Summary of Changes to PTA Med List  New: -  Discontinued: vitamin D, melatonin  Changed: -    Patient was asked about OTC/herbal products specifically.  PTA med list reflects this.    In the past week, patient estimated taking medication this percent of the time:  greater than 90%.    Medication Affordability:  Not including over the counter (OTC) medications, was there a time in the past 12 months when you did not take your medications as prescribed because of cost?: No    Allergies were reviewed, assessed, and updated with the patient.      Patient does not use any multi-dose medications prior to admission.    The information provided in this note is only as accurate as the sources available at the time of the update(s).    Thank you for the opportunity to participate in the care of this patient.    YASMIN CAMARENA RPH  2/10/2023 10:27 AM

## 2023-02-10 NOTE — ANESTHESIA PROCEDURE NOTES
Airway       Patient location during procedure: OR       Procedure Start/Stop Times: 2/10/2023 2:29 PM and 2/10/2023 2:35 AM  Staff -        Anesthesiologist:  Campos Ferguson MD       CRNA: Smooth Mccain APRN CRNA       Performed By: CRNA and anesthesiologist  Consent for Airway        Urgency: elective  Indications and Patient Condition       Indications for airway management: apolonia-procedural       Induction type:intravenous       Mask difficulty assessment: 1 - vent by mask    Final Airway Details       Final airway type: endotracheal airway       Successful airway: ETT - single  Endotracheal Airway Details        ETT size (mm): 7.0       Cuffed: yes       Successful intubation technique: video laryngoscopy and direct laryngoscopy       VL Blade Size: Glidescope 3       Grade View of Cords: 1       Adjucts: stylet       Position: Right       Measured from: lips       Secured at (cm): 22       Bite block used: None    Post intubation assessment        Placement verified by: capnometry, equal breath sounds and chest rise        Number of attempts at approach: 2       Number of other approaches attempted: 0       Secured with: silk tape       Ease of procedure: easy (easy with glide scope. failed avelar 2 attempt. patient has significant anterior airway.)       Dentition: Intact and Unchanged       Dental guard used and removed. Dental Guard Type: Proguard Red.    Medication(s) Administered   Medication Administration Time: 2/10/2023 2:29 PM

## 2023-02-10 NOTE — PROGRESS NOTES
Pt discharged with her daughter in stable condition. Pt and daughter verbalize understanding of discharge instructions and follow up appointments. All belonging returned to patient at time of discharge.

## 2023-02-10 NOTE — ANESTHESIA CARE TRANSFER NOTE
Patient: Felicita Henderson    Procedure: Procedure(s):  APPENDECTOMY, LAPAROSCOPIC, LYSIS OF ADHESIONS       Diagnosis: Acute appendicitis with generalized peritonitis, unspecified whether abscess present, unspecified whether gangrene present, unspecified whether perforation present [K35.20]  Diagnosis Additional Information: No value filed.    Anesthesia Type:   General     Note:    Oropharynx: oropharynx clear of all foreign objects and spontaneously breathing  Level of Consciousness: awake    Level of Supplemental Oxygen (L/min / FiO2): 6  Independent Airway: airway patency satisfactory and stable  Dentition: dentition unchanged  Vital Signs Stable: post-procedure vital signs reviewed and stable  Report to RN Given: handoff report given  Patient transferred to: PACU  Comments: Pt transferred to PACU. Extubated in OR and spontaneously breathing with sufficient gas exchange. On 6L O2 via FM. No airway. VSS. Normothermic. Report to RN at bedside.   VIKTOR Ashford CRNA     Handoff Report: Identifed the Patient, Identified the Reponsible Provider, Reviewed the pertinent medical history, Discussed the surgical course, Reviewed Intra-OP anesthesia mangement and issues during anesthesia, Set expectations for post-procedure period and Allowed opportunity for questions and acknowledgement of understanding      Vitals:  Vitals Value Taken Time   /58 02/10/23 1539   Temp 37.2  C (98.9  F) 02/10/23 1539   Pulse 68 02/10/23 1539   Resp 14 02/10/23 1539   SpO2 100 % 02/10/23 1539       Electronically Signed By: VIKTOR Kirkpatrick CRNA  February 10, 2023  3:40 PM

## 2023-02-10 NOTE — H&P
General Surgery H&P  Felicita Henderson MRN# [id number]   Age/Sex: 67 year old female YOB: 1955     Reason for visit: 1. Acute appendicitis with generalized peritonitis, unspecified whether abscess present, unspecified whether gangrene present, unspecified whether perforation present            Referring physician:  Dr. Charlton                   Assessment and Plan:   Assessment:  1.  Acute appendicitis  2.  Mitral valve prolapse    Plan:  -To the OR for laparoscopic appendectomy  -Keep patient n.p.o. and continue with IV fluid resuscitation  - The risks and benefits of the procedure were explained detail to the patient. The risks include infection, bleeding, damage to the surrounding structures. Patient verbalized understanding provided consent to undergo the procedure above.            Chief Complaint:     Chief Complaint   Patient presents with     Abdominal Pain        History is obtained from the patient    HPI:   Felicita Henderson is a 67 year old female who presents to the ED with complaints of abdominal pain.  Patient states that the abdominal pain started 2 days ago.  It started to worsen.  Patient states that the abdominal pain was more generalized and now its localized to the right lower quadrant.  Patient had a CT scan worked up to have findings of acute appendicitis.  General surgery team asked to evaluate this patient.    On general surgery evaluation, the patient signs and symptoms are as above stated.  Her current abdominal pain is located in the right lower quadrant dull aching.  Patient has no nausea no vomiting.  No fevers no chills.  Patient has history of robotic hysterectomy in the past.  Patient also has known history of mitral valve prolapse.  Patient states that she has been seen by her cardiologist who does not believe that it is severe enough to have intervention at this time.          Past Medical History:     Past Medical History:   Diagnosis Date     Endometrial cancer (H)      s/p hysterectomy 4/2014     Hypothyroidism      Obesity               Past Surgical History:     Past Surgical History:   Procedure Laterality Date     BLADDER SUSPENSION  08/01/2010     HYSTERECTOMY  04/01/2014     WISDOM TOOTH EXTRACTION  1971             Social History:    reports that she has never smoked. She has never used smokeless tobacco. She reports current alcohol use. She reports that she does not currently use drugs.           Family History:     Family History   Problem Relation Age of Onset     Hypertension Mother      Diabetes Type 1 Mother      Thyroid Cancer Mother      Alzheimer Disease Father      Diabetes Type 1 Brother               Allergies:   No Known Allergies           Medications:     Prior to Admission medications    Medication Sig Start Date End Date Taking? Authorizing Provider   ergocalciferol, vitamin D2, (VITAMIN D2 ORAL) [ERGOCALCIFEROL, VITAMIN D2, (VITAMIN D2 ORAL)] Take by mouth. 3/31/21   Provider, Historical   fish oil-omega-3 fatty acids (FISH OIL) 300-1,000 mg capsule [FISH OIL-OMEGA-3 FATTY ACIDS (FISH OIL) 300-1,000 MG CAPSULE] Take 2 g by mouth daily. 3/31/21   Provider, Historical   gabapentin (NEURONTIN) 300 MG capsule Take 1 capsule (300 mg) by mouth nightly as needed for other (sleep) 7/26/22   Gregoria Michaels DO   Melatonin 10 MG TABS tablet Take 10 mg by mouth nightly as needed for sleep    Reported, Patient   MULTIVITAMIN ORAL [MULTIVITAMIN ORAL] Take by mouth. 3/31/21   Provider, Historical   oxybutynin ER (DITROPAN XL) 10 MG 24 hr tablet TAKE 1 TABLET BY MOUTH EVERY DAY 7/23/22   Gregoria Michaels DO   rosuvastatin (CRESTOR) 5 MG tablet Take 1 tablet (5 mg) by mouth daily 7/30/22   Gregoria Michaels DO              Review of Systems:   A 12 point Review of Systems is negative other than noted in the HPI            Physical Exam:     Patient Vitals for the past 24 hrs:   BP Temp Temp src Pulse Resp SpO2 Height Weight   02/10/23 0845 124/60 -- -- 67 -- 97 % -- --  "  02/10/23 0830 128/63 -- -- 65 -- 96 % -- --   02/10/23 0815 132/63 -- -- 67 -- 98 % -- --   02/10/23 0800 130/63 -- -- 65 -- 98 % -- --   02/10/23 0745 137/67 -- -- 71 -- 97 % -- --   02/10/23 0726 (!) 142/68 98.4  F (36.9  C) Oral 70 16 97 % 1.727 m (5' 8\") 63.5 kg (140 lb)        No intake or output data in the 24 hours ending 02/10/23 1000   Constitutional:   awake, alert, cooperative, no apparent distress, and appears stated age       Eyes:   PERRL, conjunctiva/corneas clear, EOM's intact; no scleral edema or icterus noted        ENT:   Normocephalic, without obvious abnormality, atraumatic, Lips, mucosa, and tongue normal        Hematologic / Lymphatic:   No lymphadenopathy       Lungs:   Normal respiratory effort, no accessory muscle use       Cardiovascular:   Regular rate and rhythm       Abdomen:   Soft, nondistended, tender to palpation right lower quadrant over McBurney's point       Musculoskeletal:   No obvious swelling, bruising or deformity       Skin:   Skin color and texture normal for patient, no rashes or lesions              Data:         All imaging studies reviewed by me. I reviewed the images, and I agree with acute appendicitis identified on the CT scan      DO Matthew Amin DO  General Surgeon  [facility]  [facility]gery [address]  [address]  [place] [address]?  Office: [phone]  Employed by - [employer] [facility]  Pager: [phone]         "

## 2023-02-10 NOTE — ED TRIAGE NOTES
Patient c/o mid abdominal pain and lower abdominal started last night.       Triage Assessment     Row Name 02/10/23 0729       Triage Assessment (Adult)    Airway WDL WDL       Respiratory WDL    Respiratory WDL WDL       Skin Circulation/Temperature WDL    Skin Circulation/Temperature WDL WDL       Cardiac WDL    Cardiac WDL WDL       Peripheral/Neurovascular WDL    Peripheral Neurovascular WDL WDL       Cognitive/Neuro/Behavioral WDL    Cognitive/Neuro/Behavioral WDL WDL

## 2023-02-10 NOTE — ED NOTES
"Patient states abdominal pain started last night. 2 days prior patient states she felt a couple of \"twinges\" and stated she thought it might be the start of a kidney stone. Patient has difficulty walking due to the pain. The pain is generalized in lower abdomen.  "

## 2023-02-11 NOTE — ANESTHESIA POSTPROCEDURE EVALUATION
Patient: Felicita Henderson    Procedure: Procedure(s):  APPENDECTOMY, LAPAROSCOPIC, LYSIS OF ADHESIONS       Anesthesia Type:  General    Note:  Disposition: Outpatient   Postop Pain Control: Uneventful            Sign Out: Well controlled pain   PONV: No   Neuro/Psych: Uneventful            Sign Out: Acceptable/Baseline neuro status   Airway/Respiratory: Uneventful            Sign Out: Acceptable/Baseline resp. status   CV/Hemodynamics: Uneventful            Sign Out: Acceptable CV status; No obvious hypovolemia; No obvious fluid overload   Other NRE: NONE   DID A NON-ROUTINE EVENT OCCUR? No    Event details/Postop Comments:  Patient discharged home yesterday with no complications noted and no concerns from PACU staff. Unable to assess patient personally prior to discharge.           Last vitals:  Vitals Value Taken Time   /58 02/10/23 1730   Temp 36.2  C (97.2  F) 02/10/23 1715   Pulse 83 02/10/23 1733   Resp 32 02/10/23 1733   SpO2 97 % 02/10/23 1733   Vitals shown include unvalidated device data.    Electronically Signed By: Isma Marte MD  February 11, 2023  11:15 AM

## 2023-02-13 LAB
PATH REPORT.COMMENTS IMP SPEC: NORMAL
PATH REPORT.COMMENTS IMP SPEC: NORMAL
PATH REPORT.FINAL DX SPEC: NORMAL
PATH REPORT.GROSS SPEC: NORMAL
PATH REPORT.MICROSCOPIC SPEC OTHER STN: NORMAL
PATH REPORT.RELEVANT HX SPEC: NORMAL
PHOTO IMAGE: NORMAL

## 2023-02-13 PROCEDURE — 88304 TISSUE EXAM BY PATHOLOGIST: CPT | Mod: 26 | Performed by: PATHOLOGY

## 2023-02-17 ENCOUNTER — TELEPHONE (OUTPATIENT)
Dept: SURGERY | Facility: CLINIC | Age: 68
End: 2023-02-17
Payer: COMMERCIAL

## 2023-02-17 NOTE — TELEPHONE ENCOUNTER
[facility] Post-Op Phone Call                     Surgeon: Emeka Velasquez MD    Date of Surgery: Laparoscopic Appendectomy 02/10/2023  Discharge Date: 02/10/2023    Date/Time Called:   Date: 2/17/2023 Time: 9:21 AM   Attempt: First    Pain Control:  Intensity: Moderate (4 - 5)   Duration/Location/Explain: When moving around at bulge  What makes it better/worse? Resting     Medications:  Narcotic Use - No    Incisions:  Drainage? clean and dry  Any fever type symptoms? No    GI:  Nausea? No  Vomiting? No  BM? Yes  Gas? Yes  Voiding Frequency? 4 or more/day   Appetite? Good    Activity:  Walking activity? Yes  Frequency/Type: Throughout the day   Restrictions: x 1 week       Post-op appointment made? Yes    Patient is doing well after surgery; however, she has a bulge over an incision that has been present since surgery. She says it is uncomfortable and causes pain with walking and moving around. No fevers or chills. No redness or drainage from the area. I explained that this could be a seroma, hematoma, or a hernia but she would need to be seen to be sure. Appointment scheduled for Monday with SHERRELL. Encouraged her to call if she develops a fever, chills or the symptoms become worse. She expressed understanding.       [facility]      Shira Torres RN  [facility]  General Surgery  [facility] [address]  [email or web address]   Office:[phone]  Employed by [facility],

## 2023-02-17 NOTE — TELEPHONE ENCOUNTER
----- Message from Kelsea Baca DO sent at 2/16/2023  6:24 PM CST -----  Regarding: Needs in person  Postop appy.  Worried that she isn't recovering fast enough and that she might have a hernia.  Can someone get her in to be seen?    [place]

## 2023-02-20 ENCOUNTER — OFFICE VISIT (OUTPATIENT)
Dept: SURGERY | Facility: CLINIC | Age: 68
End: 2023-02-20
Payer: COMMERCIAL

## 2023-02-20 VITALS — SYSTOLIC BLOOD PRESSURE: 126 MMHG | DIASTOLIC BLOOD PRESSURE: 68 MMHG

## 2023-02-20 DIAGNOSIS — G89.18 ACUTE POST-OPERATIVE PAIN: ICD-10-CM

## 2023-02-20 DIAGNOSIS — Z48.89 ENCOUNTER FOR POSTOPERATIVE CARE: Primary | ICD-10-CM

## 2023-02-20 PROCEDURE — 99024 POSTOP FOLLOW-UP VISIT: CPT | Performed by: PHYSICIAN ASSISTANT

## 2023-02-20 NOTE — LETTER
2/20/2023         RE: Felicita Henderson  [id number] [facility] [address]        Dear Colleague,    Thank you for referring your patient, Felicita Henderson, to the [facility] [place]. Please see a copy of my visit note below.    HPI: Pt is here for follow up of a lap appendectomy with Dr Velasquez 2/10. She is having significant pain at her left lower abdomen incision. At first was sharp and burning. Worse with movement Now she just has pain that is not improving much. No nausea, vomiting, fevers, chest pains. She is having normal bms.       /68 (BP Location: Right arm)     EXAM:  GENERAL:Appears well  ABDOMEN:  Soft, +BS  SURGICAL WOUNDS:  Incisions healing well, no enduration or drainage.- exquisite pain left lower incision. No fluctuance, hernia, erythema or swelling. It is retracted in from suturing. Mild bruising.         Assessment/Plan: . Incisional pain without any evidence of infection, hernia or surgical complications. Will have her take one aleve bid for three days. If pain worsens or does not improve I can order outpatient CT.     Gala DUARTE              Again, thank you for allowing me to participate in the care of your patient.        Sincerely,        Gala Hubbard PA-C

## 2023-02-21 NOTE — PROGRESS NOTES
HPI: Pt is here for follow up of a lap appendectomy with Dr Velasquez 2/10. She is having significant pain at her left lower abdomen incision. At first was sharp and burning. Worse with movement Now she just has pain that is not improving much. No nausea, vomiting, fevers, chest pains. She is having normal bms.       /68 (BP Location: Right arm)     EXAM:  GENERAL:Appears well  ABDOMEN:  Soft, +BS  SURGICAL WOUNDS:  Incisions healing well, no enduration or drainage.- exquisite pain left lower incision. No fluctuance, hernia, erythema or swelling. It is retracted in from suturing. Mild bruising.     Final Diagnosis   APPENDIX, LAPAROSCOPIC APPENDECTOMY AND LYSIS OF ADHESIONS:        1.  MODERATE TO MARKED ACUTE APPENDICITIS AND PERIAPPENDICITIS        2.  OBSTRUCTING 0.6 CM FECALITH        3.  NO EVIDENCE OF DYSPLASIA OR MALIGNANCY         Assessment/Plan: . Incisional pain without any evidence of infection, hernia or surgical complications. Will have her take one aleve bid for three days. If pain worsens or does not improve I can order outpatient CT.     Gala DUARTE

## 2023-07-19 DIAGNOSIS — R35.0 URINARY FREQUENCY: ICD-10-CM

## 2023-07-19 DIAGNOSIS — E78.2 MIXED HYPERLIPIDEMIA: ICD-10-CM

## 2023-07-19 RX ORDER — ROSUVASTATIN CALCIUM 5 MG/1
TABLET, COATED ORAL
Qty: 90 TABLET | Refills: 0 | Status: SHIPPED | OUTPATIENT
Start: 2023-07-19 | End: 2023-10-16

## 2023-07-19 RX ORDER — OXYBUTYNIN CHLORIDE 10 MG/1
TABLET, EXTENDED RELEASE ORAL
Qty: 90 TABLET | Refills: 0 | Status: SHIPPED | OUTPATIENT
Start: 2023-07-19 | End: 2023-10-20

## 2023-07-19 NOTE — TELEPHONE ENCOUNTER
"Last Written Prescription Date:  7/23/2022  Last Fill Quantity: 90,  # refills: 3   Last office visit provider:  7/26/2022     Requested Prescriptions   Pending Prescriptions Disp Refills     oxybutynin ER (DITROPAN XL) 10 MG 24 hr tablet [Pharmacy Med Name: OXYBUTYNIN CL ER 10 MG TABLET] 90 tablet 3     Sig: TAKE 1 TABLET BY MOUTH EVERY DAY       Muscarinic Antagonists (Urinary Incontinence Agents) Passed - 7/19/2023  1:29 PM        Passed - Recent (12 mo) or future (30 days) visit within the authorizing provider's specialty     Patient has had an office visit with the authorizing provider or a provider within the authorizing providers department within the previous 12 mos or has a future within next 30 days. See \"Patient Info\" tab in inbasket, or \"Choose Columns\" in Meds & Orders section of the refill encounter.              Passed - Medication is Oxybutynin and patient is 5 years of age or older        Passed - Patient does not have a diagnosis of glaucoma on the problem list     If glaucoma diagnosis is new, refer refill to physician.          Passed - Medication is active on med list        Passed - Patient is 18 years of age or older      Last Written Prescription Date:  7/30/2022  Last Fill Quantity: 90,  # refills: 3   Last office visit provider:  7/26/2022      rosuvastatin (CRESTOR) 5 MG tablet [Pharmacy Med Name: ROSUVASTATIN CALCIUM 5 MG TAB] 90 tablet 3     Sig: TAKE 1 TABLET BY MOUTH EVERY DAY       Statins Protocol Passed - 7/19/2023  1:30 PM        Passed - LDL on file in past 12 months     Recent Labs   Lab Test 07/26/22  0820   *             Passed - No abnormal creatine kinase in past 12 months     No lab results found.             Passed - Recent (12 mo) or future (30 days) visit within the authorizing provider's specialty     Patient has had an office visit with the authorizing provider or a provider within the authorizing providers department within the previous 12 mos or has a future " "within next 30 days. See \"Patient Info\" tab in inbasket, or \"Choose Columns\" in Meds & Orders section of the refill encounter.              Passed - Medication is active on med list        Passed - Patient is age 18 or older        Passed - No active pregnancy on record        Passed - No positive pregnancy test in past 12 months             Yesika Guzmán RN 07/19/23 1:30 PM  "

## 2023-08-05 ENCOUNTER — HEALTH MAINTENANCE LETTER (OUTPATIENT)
Age: 68
End: 2023-08-05

## 2023-09-25 ENCOUNTER — TELEPHONE (OUTPATIENT)
Dept: FAMILY MEDICINE | Facility: CLINIC | Age: 68
End: 2023-09-25
Payer: COMMERCIAL

## 2023-09-25 NOTE — TELEPHONE ENCOUNTER
Attempted to call patient, left message for return call to clinic. When patient returns call, please inform patient that PCP will be going out on maternity leave at any time now. Please offer to schedule next available with PCP for AWV and offer med check visit with any available provider.    Shira Calix RN

## 2023-09-25 NOTE — TELEPHONE ENCOUNTER
Reason for Call:  Appointment Request    Patient requesting this type of appt: Chronic Diease Management/Medication/Follow-Up    Requested provider: Gregoria Michaels    Reason patient unable to be scheduled: Not within requested timeframe    When does patient want to be seen/preferred time:  ASAP, pt says she needs to be seen for medication refill as she is out of refills of Rosuvastatin Calcium 5 MG Oral Tablet (CRESTOR) & said she needs to continue taking it. Pt said she also needs to complete fasting labs for this medication.     Comments: Pt was very upset that Dr. Michaels is scheduling out until 2024. Originally just wanted to get medication taken care of during an AWE but those are booking out further. Declined scheduling earliest available & being added to waitlist via [employer].     Could we send this information to you in [employer] or would you prefer to receive a phone call?:   Patient would prefer a phone call   Okay to leave a detailed message?: Yes at Cell number on file:    Telephone Information:   Mobile [phone]       Call taken on 9/25/2023 at 11:34 AM by Sarah Chatterjee

## 2023-10-04 NOTE — TELEPHONE ENCOUNTER
Per chart review, patient has since scheduled a medication check appointment with another provider for 10/24. Writer will close this encounter.    Shira Calix RN

## 2023-10-14 ENCOUNTER — HEALTH MAINTENANCE LETTER (OUTPATIENT)
Age: 68
End: 2023-10-14

## 2023-10-14 DIAGNOSIS — E78.2 MIXED HYPERLIPIDEMIA: ICD-10-CM

## 2023-10-16 RX ORDER — ROSUVASTATIN CALCIUM 5 MG/1
TABLET, COATED ORAL
Qty: 90 TABLET | Refills: 0 | Status: SHIPPED | OUTPATIENT
Start: 2023-10-16 | End: 2023-10-24

## 2023-10-20 DIAGNOSIS — R35.0 URINARY FREQUENCY: ICD-10-CM

## 2023-10-20 RX ORDER — OXYBUTYNIN CHLORIDE 10 MG/1
TABLET, EXTENDED RELEASE ORAL
Qty: 30 TABLET | Refills: 0 | Status: SHIPPED | OUTPATIENT
Start: 2023-10-20 | End: 2023-11-20

## 2023-10-24 ENCOUNTER — OFFICE VISIT (OUTPATIENT)
Dept: FAMILY MEDICINE | Facility: CLINIC | Age: 68
End: 2023-10-24
Payer: COMMERCIAL

## 2023-10-24 VITALS
HEIGHT: 68 IN | RESPIRATION RATE: 16 BRPM | HEART RATE: 74 BPM | BODY MASS INDEX: 20.76 KG/M2 | OXYGEN SATURATION: 99 % | SYSTOLIC BLOOD PRESSURE: 137 MMHG | DIASTOLIC BLOOD PRESSURE: 76 MMHG | TEMPERATURE: 98 F | WEIGHT: 137 LBS

## 2023-10-24 DIAGNOSIS — R35.0 URINARY FREQUENCY: ICD-10-CM

## 2023-10-24 DIAGNOSIS — R73.09 ELEVATED GLUCOSE: ICD-10-CM

## 2023-10-24 DIAGNOSIS — Z53.20 MAMMOGRAM DECLINED: ICD-10-CM

## 2023-10-24 DIAGNOSIS — Z23 IMMUNIZATION DUE: ICD-10-CM

## 2023-10-24 DIAGNOSIS — E78.2 MIXED HYPERLIPIDEMIA: Primary | ICD-10-CM

## 2023-10-24 DIAGNOSIS — F51.04 PSYCHOPHYSIOLOGICAL INSOMNIA: ICD-10-CM

## 2023-10-24 LAB
CHOLEST SERPL-MCNC: 152 MG/DL
HBA1C MFR BLD: 5.2 % (ref 0–5.6)
HDLC SERPL-MCNC: 64 MG/DL
LDLC SERPL CALC-MCNC: 77 MG/DL
NONHDLC SERPL-MCNC: 88 MG/DL
TRIGL SERPL-MCNC: 57 MG/DL

## 2023-10-24 PROCEDURE — 36415 COLL VENOUS BLD VENIPUNCTURE: CPT | Performed by: FAMILY MEDICINE

## 2023-10-24 PROCEDURE — G0008 ADMIN INFLUENZA VIRUS VAC: HCPCS | Performed by: FAMILY MEDICINE

## 2023-10-24 PROCEDURE — 90677 PCV20 VACCINE IM: CPT | Performed by: FAMILY MEDICINE

## 2023-10-24 PROCEDURE — G0009 ADMIN PNEUMOCOCCAL VACCINE: HCPCS | Performed by: FAMILY MEDICINE

## 2023-10-24 PROCEDURE — 90662 IIV NO PRSV INCREASED AG IM: CPT | Performed by: FAMILY MEDICINE

## 2023-10-24 PROCEDURE — 83036 HEMOGLOBIN GLYCOSYLATED A1C: CPT | Performed by: FAMILY MEDICINE

## 2023-10-24 PROCEDURE — 99214 OFFICE O/P EST MOD 30 MIN: CPT | Mod: 25 | Performed by: FAMILY MEDICINE

## 2023-10-24 PROCEDURE — 80061 LIPID PANEL: CPT | Performed by: FAMILY MEDICINE

## 2023-10-24 RX ORDER — GABAPENTIN 300 MG/1
300 CAPSULE ORAL
Qty: 30 CAPSULE | Refills: 1 | Status: SHIPPED | OUTPATIENT
Start: 2023-10-24

## 2023-10-24 RX ORDER — OXYBUTYNIN CHLORIDE 10 MG/1
TABLET, EXTENDED RELEASE ORAL
Qty: 90 TABLET | Refills: 3 | Status: CANCELLED | OUTPATIENT
Start: 2023-10-24

## 2023-10-24 RX ORDER — ROSUVASTATIN CALCIUM 5 MG/1
5 TABLET, COATED ORAL DAILY
Qty: 90 TABLET | Refills: 3 | Status: SHIPPED | OUTPATIENT
Start: 2023-10-24

## 2023-10-24 RX ORDER — OXYBUTYNIN CHLORIDE 15 MG/1
15 TABLET, EXTENDED RELEASE ORAL DAILY
Qty: 90 TABLET | Refills: 3 | Status: SHIPPED | OUTPATIENT
Start: 2023-10-24

## 2023-10-24 NOTE — PATIENT INSTRUCTIONS
Check with insurance regarding coverage of the RSV and shingles shots. Consider completing at the pharmacy is cheaper.

## 2023-10-24 NOTE — PROGRESS NOTES
Assessment & Plan     1. Mixed hyperlipidemia  - rosuvastatin (CRESTOR) 5 MG tablet; Take 1 tablet (5 mg) by mouth daily  Dispense: 90 tablet; Refill: 3  - Lipid panel reflex to direct LDL Fasting; Future    On statin for primary prevention, we will update ASCVD risk profile.  Tolerating Crestor well, plan to continue.    2. Urinary frequency  - oxyBUTYnin ER (DITROPAN XL) 15 MG 24 hr tablet; Take 1 tablet (15 mg) by mouth daily  Dispense: 90 tablet; Refill: 3    Symptoms not adequately controlled with oxybutynin extended release 10 mg daily.  We will trial 15 mg daily.  If not effective, plan to continue titrating upwards, next dose to be 20 mg daily.    3. Psychophysiological insomnia  - gabapentin (NEURONTIN) 300 MG capsule; Take 1 capsule (300 mg) by mouth nightly as needed for other (sleep)  Dispense: 30 capsule; Refill: 1    Using intermittently, will refill today.    4. Elevated glucose  - Hemoglobin A1c; Future    Last glucose elevated, screen for prediabetes/diabetes with A1c today.     5. Immunization due  - Pneumococcal 20 Valent Conjugate (PCV20)  - INFLUENZA VACCINE 65+ (FLUZONE HD)    6. Mammogram declined      Lara Chávez [facility]    Magali Mims is a 67 year old, presenting for the following health issues:  Recheck Medication      10/24/2023     9:47 AM   Additional Questions   Roomed by Kelsea WILSON CMA   Accompanied by Self     Chief Complaints and History of Present Illnesses   Patient presents with    Recheck Medication     Fasting for labs.        History of Present Illness       Reason for visit:  Med renewal and cholesterol check    She eats 2-3 servings of fruits and vegetables daily.She consumes 1 sweetened beverage(s) daily.She exercises with enough effort to increase her heart rate 9 or less minutes per day.  She exercises with enough effort to increase her heart rate 3 or less days per week.   She is taking medications regularly.    "  OXYBUTYNIN: Currently taking XL 10mg daily. Has been on for a long time. As been on for 20 years.     Getting up in the middle of the night.     CRESTOR: Currently taking 5mg daily. Also taking fish oil daily.   Started June of 2022. Tolerating okay. Primary prevention.      The 10-year ASCVD risk score (Sanjuana TOVAR, et al., 2019) is: 8.1%    Values used to calculate the score:      Age: 67 years      Sex: Female      Is Non- : No      Diabetic: No      Tobacco smoker: No      Systolic Blood Pressure: 137 mmHg      Is BP treated: No      HDL Cholesterol: 65 mg/dL      Total Cholesterol: 248 mg/dL     Lab Results   Component Value Date    CHOL 248 07/26/2022     Lab Results   Component Value Date    HDL 65 07/26/2022     Lab Results   Component Value Date     07/26/2022     Lab Results   Component Value Date    TRIG 64 07/26/2022     GABAPENTIN: Cervical herniated disc, was having pain, not sleeping well. Using sparingly. Developed tolerance if taking too often.      BLOOD PRESSURE:   BP Readings from Last 6 Encounters:   10/24/23 137/76   02/20/23 126/68   02/10/23 130/61   07/26/22 (!) 141/73   08/03/21 121/79   04/19/21 (!) 140/67       HEALTH MAINTENANCE:   - MAMMO: declines at this time    - FLU: will do today    - PCV: will do today      Review of Systems   See HPI above.       Objective    /76   Pulse 74   Temp 98  F (36.7  C)   Resp 16   Ht 1.727 m (5' 8\")   Wt 62.1 kg (137 lb)   SpO2 99%   BMI 20.83 kg/m    Body mass index is 20.83 kg/m .  Physical Exam   GENERAL: healthy, alert and no distress  NECK: no adenopathy, no asymmetry, masses, or scars and thyroid normal to palpation  RESP: lungs clear to auscultation - no rales, rhonchi or wheezes  CV: regular rate and rhythm, normal S1 S2, no S3 or S4, no murmur, click or rub, no peripheral edema and peripheral pulses strong  MS: no gross musculoskeletal defects noted, no edema          "

## 2023-10-24 NOTE — RESULT ENCOUNTER NOTE
Patient updated by [employer] message with lab results.   The 10-year ASCVD risk score (Sanjuana DK, et al., 2019) is: 6.6%      Karla Mims,  Your labs have returned and look good. Continue current treatment plan.  Reach out with follow up questions or concerns.  Lara Chávez, DO

## 2023-11-14 DIAGNOSIS — R35.0 URINARY FREQUENCY: ICD-10-CM

## 2023-11-14 RX ORDER — OXYBUTYNIN CHLORIDE 10 MG/1
TABLET, EXTENDED RELEASE ORAL
Qty: 30 TABLET | Refills: 0 | OUTPATIENT
Start: 2023-11-14

## 2023-11-18 DIAGNOSIS — R35.0 URINARY FREQUENCY: ICD-10-CM

## 2023-11-20 RX ORDER — OXYBUTYNIN CHLORIDE 10 MG/1
TABLET, EXTENDED RELEASE ORAL
Qty: 90 TABLET | Refills: 2 | Status: SHIPPED | OUTPATIENT
Start: 2023-11-20 | End: 2024-08-19

## 2024-08-17 DIAGNOSIS — R35.0 URINARY FREQUENCY: ICD-10-CM

## 2024-08-19 RX ORDER — OXYBUTYNIN CHLORIDE 10 MG/1
TABLET, EXTENDED RELEASE ORAL
Qty: 90 TABLET | Refills: 0 | Status: SHIPPED | OUTPATIENT
Start: 2024-08-19

## 2024-08-19 NOTE — TELEPHONE ENCOUNTER
First Attempt: I sent a my chart message to the patient to please contact our office to schedule a Med Check appt.

## 2024-10-31 ENCOUNTER — OFFICE VISIT (OUTPATIENT)
Dept: FAMILY MEDICINE | Facility: CLINIC | Age: 69
End: 2024-10-31
Payer: COMMERCIAL

## 2024-10-31 VITALS
BODY MASS INDEX: 21.46 KG/M2 | OXYGEN SATURATION: 99 % | HEART RATE: 53 BPM | DIASTOLIC BLOOD PRESSURE: 77 MMHG | HEIGHT: 68 IN | RESPIRATION RATE: 12 BRPM | WEIGHT: 141.6 LBS | TEMPERATURE: 97.8 F | SYSTOLIC BLOOD PRESSURE: 154 MMHG

## 2024-10-31 DIAGNOSIS — Z00.00 ENCOUNTER FOR MEDICARE ANNUAL WELLNESS EXAM: Primary | ICD-10-CM

## 2024-10-31 DIAGNOSIS — E78.2 MIXED HYPERLIPIDEMIA: ICD-10-CM

## 2024-10-31 DIAGNOSIS — E04.1 THYROID NODULE: Chronic | ICD-10-CM

## 2024-10-31 DIAGNOSIS — Z78.0 POSTMENOPAUSAL STATUS: ICD-10-CM

## 2024-10-31 DIAGNOSIS — R03.0 ELEVATED BLOOD-PRESSURE READING WITHOUT DIAGNOSIS OF HYPERTENSION: ICD-10-CM

## 2024-10-31 DIAGNOSIS — R35.0 URINARY FREQUENCY: ICD-10-CM

## 2024-10-31 DIAGNOSIS — Z12.31 VISIT FOR SCREENING MAMMOGRAM: ICD-10-CM

## 2024-10-31 PROCEDURE — 84443 ASSAY THYROID STIM HORMONE: CPT | Performed by: FAMILY MEDICINE

## 2024-10-31 PROCEDURE — G0439 PPPS, SUBSEQ VISIT: HCPCS | Performed by: FAMILY MEDICINE

## 2024-10-31 PROCEDURE — 80061 LIPID PANEL: CPT | Performed by: FAMILY MEDICINE

## 2024-10-31 PROCEDURE — 36415 COLL VENOUS BLD VENIPUNCTURE: CPT | Performed by: FAMILY MEDICINE

## 2024-10-31 PROCEDURE — 99214 OFFICE O/P EST MOD 30 MIN: CPT | Mod: 25 | Performed by: FAMILY MEDICINE

## 2024-10-31 PROCEDURE — 80053 COMPREHEN METABOLIC PANEL: CPT | Performed by: FAMILY MEDICINE

## 2024-10-31 PROCEDURE — G0008 ADMIN INFLUENZA VIRUS VAC: HCPCS | Performed by: FAMILY MEDICINE

## 2024-10-31 PROCEDURE — 90662 IIV NO PRSV INCREASED AG IM: CPT | Performed by: FAMILY MEDICINE

## 2024-10-31 RX ORDER — ROSUVASTATIN CALCIUM 5 MG/1
5 TABLET, COATED ORAL DAILY
Qty: 90 TABLET | Refills: 3 | Status: SHIPPED | OUTPATIENT
Start: 2024-10-31

## 2024-10-31 RX ORDER — OXYBUTYNIN CHLORIDE 10 MG/1
TABLET, EXTENDED RELEASE ORAL
Qty: 90 TABLET | Refills: 3 | Status: SHIPPED | OUTPATIENT
Start: 2024-10-31

## 2024-10-31 SDOH — HEALTH STABILITY: PHYSICAL HEALTH: ON AVERAGE, HOW MANY DAYS PER WEEK DO YOU ENGAGE IN MODERATE TO STRENUOUS EXERCISE (LIKE A BRISK WALK)?: 5 DAYS

## 2024-10-31 SDOH — HEALTH STABILITY: PHYSICAL HEALTH: ON AVERAGE, HOW MANY MINUTES DO YOU ENGAGE IN EXERCISE AT THIS LEVEL?: 20 MIN

## 2024-10-31 ASSESSMENT — SOCIAL DETERMINANTS OF HEALTH (SDOH): HOW OFTEN DO YOU GET TOGETHER WITH FRIENDS OR RELATIVES?: MORE THAN THREE TIMES A WEEK

## 2024-10-31 NOTE — PROGRESS NOTES
Preventive Care Visit  [facility]  Oct 31, 2024      Assessment & Plan     Encounter for Medicare annual wellness exam  Counseling  Appropriate preventive services were addressed with this patient via screening, questionnaire, or discussion as appropriate for fall prevention, nutrition, physical activity, social engagement, weight loss and cognition.  Checklist reviewing preventive services available has been given to the patient.  Reviewed patient's diet, addressing concerns and/or questions.   The patient was instructed to see the dentist every 6 months.   She is at risk for psychosocial distress and has been provided with information to reduce risk.   Discussed possible causes of fatigue. The patient was provided with written information regarding signs of hearing loss.   Information on urinary incontinence and treatment options given to patient.     Mixed hyperlipidemia  This fasting labs on rosuvastatin 5 mg daily.  - Lipid panel reflex to direct LDL Non-fasting; Future  - rosuvastatin (CRESTOR) 5 MG tablet; Take 1 tablet (5 mg) by mouth daily.  - Comprehensive metabolic panel (BMP + Alb, Alk Phos, ALT, AST, Total. Bili, TP); Future    Urinary frequency  Uses oxybutynin for urinary frequency, refills provided.  - oxyBUTYnin ER (DITROPAN XL) 10 MG 24 hr tablet; TAKE 1 TABLET BY MOUTH EVERY DAY    Visit for screening mammogram  Declined further mammograms    Postmenopausal status  - DX Bone Density; Future    Elevated blood-pressure reading without diagnosis of hypertension  BP remains elevated on recheck.  Recommend checking blood pressures at home and scheduling follow-up visit if greater than 140/90   - Home Blood Pressure Monitor Order for DME - ONLY FOR DME    Thyroid nodule  History of thyroid nodule, reports prior biopsy that was negative.  No notable abnormalities palpated today.  Reassess TSH along with thyroid ultrasound today to ensure  stable.  - TSH with free T4 reflex  - US Thyroid; Future    Patient has been advised of split billing requirements and indicates understanding: Yes        Subjective   Felicita is a 68 year old, presenting for the following:  Physical (Possible fluid in ear.  )        10/31/2024     9:59 AM   Additional Questions   Roomed by OSCAR Lerner CMA([facility])           HPI    Health Care Directive  Patient does not have a Health Care Directive: Discussed advance care planning with patient; information given to patient to review.      10/31/2024   General Health   How would you rate your overall physical health? Good   Feel stress (tense, anxious, or unable to sleep) Only a little      (!) STRESS CONCERN      10/31/2024   Nutrition   Diet: Regular (no restrictions)            10/31/2024   Exercise   Days per week of moderate/strenous exercise 5 days   Average minutes spent exercising at this level 20 min            10/31/2024   Social Factors   Frequency of gathering with friends or relatives More than three times a week   Worry food won't last until get money to buy more No   Food not last or not have enough money for food? No   Do you have housing? (Housing is defined as stable permanent housing and does not include staying ouside in a car, in a tent, in an abandoned building, in an overnight shelter, or couch-surfing.) Yes   Are you worried about losing your housing? No   Lack of transportation? No   Unable to get utilities (heat,electricity)? No            10/31/2024   Fall Risk   Fallen 2 or more times in the past year? No     No    Trouble with walking or balance? No     No        Patient-reported    Multiple values from one day are sorted in reverse-chronological order          10/31/2024   Activities of Daily Living- Home Safety   Needs help with the following daily activites None of the above   Safety concerns in the home None of the above            10/31/2024   Dental   Dentist two times every year? (!) NO             10/31/2024   Hearing Screening   Hearing concerns? (!) IT'S HARD TO FOLLOW A CONVERSATION IN A NOISY RESTAURANT OR CROWDED ROOM.            10/31/2024   Driving Risk Screening   Patient/family members have concerns about driving No            10/31/2024   General Alertness/Fatigue Screening   Have you been more tired than usual lately? (!) YES            10/31/2024   Urinary Incontinence Screening   Bothered by leaking urine in past 6 months Yes            10/31/2024   TB Screening   Were you born outside of the [place]? No            Today's PHQ-2 Score:       10/31/2024     7:58 AM   PHQ-2 ( 1999 Pf[employer])   Q1: Little interest or pleasure in doing things 0    Q2: Feeling down, depressed or hopeless 0    PHQ-2 Score 0    Q1: Little interest or pleasure in doing things Not at all   Q2: Feeling down, depressed or hopeless Not at all   PHQ-2 Score 0       Patient-reported           10/31/2024   Substance Use   Alcohol more than 3/day or more than 7/wk No   Do you have a current opioid prescription? No   How severe/bad is pain from 1 to 10? 0/10 (No Pain)   Do you use any other substances recreationally? No        Social History     Tobacco Use    Smoking status: Never    Smokeless tobacco: Never   Vaping Use    Vaping status: Never Used   Substance Use Topics    Alcohol use: Yes     Comment: 1-2/week    Drug use: Never          Mammogram Screening - Mammography discussed and declined       ASCVD Risk   The 10-year ASCVD risk score (Sanjuana TOVAR, et al., 2019) is: 9.4%    Values used to calculate the score:      Age: 68 years      Sex: Female      Is Non- : No      Diabetic: No      Tobacco smoker: No      Systolic Blood Pressure: 154 mmHg      Is BP treated: No      HDL Cholesterol: 64 mg/dL      Total Cholesterol: 152 mg/dL          Reviewed and updated as needed this visit by Provider   Tobacco  Allergies  Meds  Problems  Med Hx  Surg Hx  Fam Hx            Current providers sharing in  "care for this patient include:  Patient Care Team:  Gregoria Michaels DO as PCP - General (Family Medicine)  Lara Chávez DO as Assigned PCP    The following health maintenance items are reviewed in Epic and correct as of today:  Health Maintenance   Topic Date Due    ANNUAL REVIEW OF  ORDERS  Never done    ZOSTER IMMUNIZATION (1 of 2) Never done    RSV VACCINE (1 - Risk 60-74 years 1-dose series) Never done    MAMMO SCREENING  04/19/2023    COVID-19 Vaccine (5 - 2024-25 season) 09/01/2024    LIPID  10/24/2024    MEDICARE ANNUAL WELLNESS VISIT  10/31/2025    FALL RISK ASSESSMENT  10/31/2025    GLUCOSE  02/10/2026    ADVANCE CARE PLANNING  10/31/2029    DTAP/TDAP/TD IMMUNIZATION (2 - Td or Tdap) 08/03/2031    COLORECTAL CANCER SCREENING  12/21/2032    DEXA  11/12/2033    PHQ-2 (once per calendar year)  Completed    INFLUENZA VACCINE  Completed    Pneumococcal Vaccine: 65+ Years  Completed    HPV IMMUNIZATION  Aged Out    MENINGITIS IMMUNIZATION  Aged Out    RSV MONOCLONAL ANTIBODY  Aged Out    HEPATITIS C SCREENING  Discontinued        Objective    Exam  BP (!) 154/77   Pulse 53   Temp 97.8  F (36.6  C) (Oral)   Resp 12   Ht 1.721 m (5' 7.75\")   Wt 64.2 kg (141 lb 9.6 oz)   SpO2 99%   BMI 21.69 kg/m     Estimated body mass index is 21.69 kg/m  as calculated from the following:    Height as of this encounter: 1.721 m (5' 7.75\").    Weight as of this encounter: 64.2 kg (141 lb 9.6 oz).    Physical Exam  GENERAL: alert and no distress  EYES: Eyes grossly normal to inspection, PERRL and conjunctivae and sclerae normal  HENT: ear canals and TM's normal, nose and mouth without ulcers or lesions  NECK: no adenopathy, no asymmetry, masses, or scars  RESP: lungs clear to auscultation - no rales, rhonchi or wheezes  CV: regular rate and rhythm, normal S1 S2, no S3 or S4, no murmur, click or rub, no peripheral edema  ABDOMEN: soft, nontender  MS: no gross musculoskeletal defects noted, no edema  SKIN: no suspicious " lesions or rashes  NEURO: Normal strength and tone, mentation intact and speech normal  PSYCH: mentation appears normal, affect normal/bright         No data to display                      8/11/2021   Vision Screen   Vision Screen Results Pass   No Corrective Lenses, PLUS LENS REQUIRED Pass          Signed Electronically by: Gregoria Michaels DO

## 2024-10-31 NOTE — PATIENT INSTRUCTIONS
Patient Education   Preventive Care Advice   This is general advice given by our system to help you stay healthy. However, your care team may have specific advice just for you. Please talk to your care team about your preventive care needs.  Nutrition  Eat 5 or more servings of fruits and vegetables each day.  Try wheat bread, brown rice and whole grain pasta (instead of white bread, rice, and pasta).  Get enough calcium and vitamin D. Check the label on foods and aim for 100% of the RDA (recommended daily allowance).  Lifestyle  Exercise at least 150 minutes each week  (30 minutes a day, 5 days a week).  Do muscle strengthening activities 2 days a week. These help control your weight and prevent disease.  No smoking.  Wear sunscreen to prevent skin cancer.  Have a dental exam and cleaning every 6 months.  Yearly exams  See your health care team every year to talk about:  Any changes in your health.  Any medicines your care team has prescribed.  Preventive care, family planning, and ways to prevent chronic diseases.  Shots (vaccines)   HPV shots (up to age 26), if you've never had them before.  Hepatitis B shots (up to age 59), if you've never had them before.  COVID-19 shot: Get this shot when it's due.  Flu shot: Get a flu shot every year.  Tetanus shot: Get a tetanus shot every 10 years.  Pneumococcal, hepatitis A, and RSV shots: Ask your care team if you need these based on your risk.  Shingles shot (for age 50 and up)  General health tests  Diabetes screening:  Starting at age 35, Get screened for diabetes at least every 3 years.  If you are younger than age 35, ask your care team if you should be screened for diabetes.  Cholesterol test: At age 39, start having a cholesterol test every 5 years, or more often if advised.  Bone density scan (DEXA): At age 50, ask your care team if you should have this scan for osteoporosis (brittle bones).  Hepatitis C: Get tested at least once in your life.  STIs (sexually  transmitted infections)  Before age 24: Ask your care team if you should be screened for STIs.  After age 24: Get screened for STIs if you're at risk. You are at risk for STIs (including HIV) if:  You are sexually active with more than one person.  You don't use condoms every time.  You or a partner was diagnosed with a sexually transmitted infection.  If you are at risk for HIV, ask about PrEP medicine to prevent HIV.  Get tested for HIV at least once in your life, whether you are at risk for HIV or not.  Cancer screening tests  Cervical cancer screening: If you have a cervix, begin getting regular cervical cancer screening tests starting at age 21.  Breast cancer scan (mammogram): If you've ever had breasts, begin having regular mammograms starting at age 40. This is a scan to check for breast cancer.  Colon cancer screening: It is important to start screening for colon cancer at age 45.  Have a colonoscopy test every 10 years (or more often if you're at risk) Or, ask your provider about stool tests like a FIT test every year or Cologuard test every 3 years.  To learn more about your testing options, visit:   .  For help making a decision, visit:   https://bit.ly/dp84524.  Prostate cancer screening test: If you have a prostate, ask your care team if a prostate cancer screening test (PSA) at age 55 is right for you.  Lung cancer screening: If you are a current or former smoker ages 50 to 80, ask your care team if ongoing lung cancer screenings are right for you.  For informational purposes only. Not to replace the advice of your health care provider. Copyright   2023 [facility] [employer]. All rights reserved. Clinically reviewed by the [facility] Transitions Program. Mbite [id number] - REV 01/24.  Hearing Loss: Care Instructions  Overview     Hearing loss is a sudden or slow decrease in how well you hear. It can range from slight to profound. Permanent hearing loss can occur with aging. It also can  happen when you are exposed long-term to loud noise. Examples include listening to loud music, riding motorcycles, or being around other loud machines.  Hearing loss can affect your work and home life. It can make you feel lonely or depressed. You may feel that you have lost your independence. But hearing aids and other devices can help you hear better and feel connected to others.  Follow-up care is a key part of your treatment and safety. Be sure to make and go to all appointments, and call your doctor if you are having problems. It's also a good idea to know your test results and keep a list of the medicines you take.  How can you care for yourself at home?  Avoid loud noises whenever possible. This helps keep your hearing from getting worse.  Always wear hearing protection around loud noises.  Wear a hearing aid as directed.  A professional can help you pick a hearing aid that will work best for you.  You can also get hearing aids over the counter for mild to moderate hearing loss.  Have hearing tests as your doctor suggests. They can show whether your hearing has changed. Your hearing aid may need to be adjusted.  Use other devices as needed. These may include:  Telephone amplifiers and hearing aids that can connect to a television, stereo, radio, or microphone.  Devices that use lights or vibrations. These alert you to the doorbell, a ringing telephone, or a baby monitor.  Television closed-captioning. This shows the words at the bottom of the screen. Most new TVs can do this.  TTY (text telephone). This lets you type messages back and forth on the telephone instead of talking or listening. These devices are also called TDD. When messages are typed on the keyboard, they are sent over the phone line to a receiving TTY. The message is shown on a monitor.  Use text messaging, social media, and email if it is hard for you to communicate by telephone.  Try to learn a listening technique called speechreading. It is  "not lipreading. You pay attention to people's gestures, expressions, posture, and tone of voice. These clues can help you understand what a person is saying. Face the person you are talking to, and have them face you. Make sure the lighting is good. You need to see the other person's face clearly.  Think about counseling if you need help to adjust to your hearing loss.  When should you call for help?  Watch closely for changes in your health, and be sure to contact your doctor if:    You think your hearing is getting worse.     You have new symptoms, such as dizziness or nausea.   Where can you learn more?  Go to https://www.[employer].net/patiented  Enter R798 in the search box to learn more about \"Hearing Loss: Care Instructions.\"  Current as of: September 27, 2023  Content Version: 14.2    2024 [employer].   Care instructions adapted under license by your healthcare professional. If you have questions about a medical condition or this instruction, always ask your healthcare professional. [employer] disclaims any warranty or liability for your use of this information.    Learning About Sleeping Well  What does sleeping well mean?     Sleeping well means getting enough sleep to feel good and stay healthy. How much sleep is enough varies among people.  The number of hours you sleep and how you feel when you wake up are both important. If you do not feel refreshed, you probably need more sleep. Another sign of not getting enough sleep is feeling tired during the day.  Experts recommend that adults get at least 7 or more hours of sleep per day. Children and older adults need more sleep.  Why is getting enough sleep important?  Getting enough quality sleep is a basic part of good health. When your sleep suffers, your physical health, mood, and your thoughts can suffer too. You may find yourself feeling more grumpy or stressed. Not getting enough sleep also can lead to serious problems, " "including injury, accidents, anxiety, and depression.  What might cause poor sleeping?  Many things can cause sleep problems, including:  Changes to your sleep schedule.  Stress. Stress can be caused by fear about a single event, such as giving a speech. Or you may have ongoing stress, such as worry about work or school.  Depression, anxiety, and other mental or emotional conditions.  Changes in your sleep habits or surroundings. This includes changes that happen where you sleep, such as noise, light, or sleeping in a different bed. It also includes changes in your sleep pattern, such as having jet lag or working a late shift.  Health problems, such as pain, breathing problems, and restless legs syndrome.  Lack of regular exercise.  Using alcohol, nicotine, or caffeine before bed.  How can you help yourself?  Here are some tips that may help you sleep more soundly and wake up feeling more refreshed.  Your sleeping area   Use your bedroom only for sleeping and sex. A bit of light reading may help you fall asleep. But if it doesn't, do your reading elsewhere in the house. Try not to use your TV, computer, smartphone, or tablet while you are in bed.  Be sure your bed is big enough to stretch out comfortably, especially if you have a sleep partner.  Keep your bedroom quiet, dark, and cool. Use curtains, blinds, or a sleep mask to block out light. To block out noise, use earplugs, soothing music, or a \"white noise\" machine.  Your evening and bedtime routine   Create a relaxing bedtime routine. You might want to take a warm shower or bath, or listen to soothing music.  Go to bed at the same time every night. And get up at the same time every morning, even if you feel tired.  What to avoid   Limit caffeine (coffee, tea, caffeinated sodas) during the day, and don't have any for at least 6 hours before bedtime.  Avoid drinking alcohol before bedtime. Alcohol can cause you to wake up more often during the night.  Try not to " "smoke or use tobacco, especially in the evening. Nicotine can keep you awake.  Limit naps during the day, especially close to bedtime.  Avoid lying in bed awake for too long. If you can't fall asleep or if you wake up in the middle of the night and can't get back to sleep within about 20 minutes, get out of bed and go to another room until you feel sleepy.  Avoid taking medicine right before bed that may keep you awake or make you feel hyper or energized. Your doctor can tell you if your medicine may do this and if you can take it earlier in the day.  If you can't sleep   Imagine yourself in a peaceful, pleasant scene. Focus on the details and feelings of being in a place that is relaxing.  Get up and do a quiet or boring activity until you feel sleepy.  Avoid drinking any liquids before going to bed to help prevent waking up often to use the bathroom.  Where can you learn more?  Go to https://www.[employer].net/patiented  Enter J942 in the search box to learn more about \"Learning About Sleeping Well.\"  Current as of: July 10, 2023  Content Version: 14.2 2024 [facility] [employer].   Care instructions adapted under license by your healthcare professional. If you have questions about a medical condition or this instruction, always ask your healthcare professional. [employer] Inc[employer] disclaims any warranty or liability for your use of this information.    Bladder Training: Care Instructions  Your Care Instructions     Bladder training is used to treat urge incontinence and stress incontinence. Urge incontinence means that the need to urinate comes on so fast that you can't get to a toilet in time. Stress incontinence means that you leak urine because of pressure on your bladder. For example, it may happen when you laugh, cough, or lift something heavy.  Bladder training can increase how long you can wait before you have to urinate. It can also help your bladder hold more urine. And it can give you better " control over the urge to urinate.  It is important to remember that bladder training takes a few weeks to a few months to make a difference. You may not see results right away, but don't give up.  Follow-up care is a key part of your treatment and safety. Be sure to make and go to all appointments, and call your doctor if you are having problems. It's also a good idea to know your test results and keep a list of the medicines you take.  How can you care for yourself at home?  Work with your doctor to come up with a bladder training program that is right for you. You may use one or more of the following methods.  Delayed urination  In the beginning, try to keep from urinating for 5 minutes after you first feel the need to go.  While you wait, take deep, slow breaths to relax. Kegel exercises can also help you delay the need to go to the bathroom.  After some practice, when you can easily wait 5 minutes to urinate, try to wait 10 minutes before you urinate.  Slowly increase the waiting period until you are able to control when you have to urinate.  Scheduled urination  Empty your bladder when you first wake up in the morning.  Schedule times throughout the day when you will urinate.  Start by going to the bathroom every hour, even if you don't need to go.  Slowly increase the time between trips to the bathroom.  When you have found a schedule that works well for you, keep doing it.  If you wake up during the night and have to urinate, do it. Apply your schedule to waking hours only.  Kegel exercises  These tighten and strengthen pelvic muscles, which can help you control the flow of urine. (If doing these exercises causes pain, stop doing them and talk with your doctor.) To do Kegel exercises:  Squeeze your muscles as if you were trying not to pass gas. Or squeeze your muscles as if you were stopping the flow of urine. Your belly, legs, and buttocks shouldn't move.  Hold the squeeze for 3 seconds, then relax for 5 to  "10 seconds.  Start with 3 seconds, then add 1 second each week until you are able to squeeze for 10 seconds.  Repeat the exercise 10 times a session. Do 3 to 8 sessions a day.  When should you call for help?  Watch closely for changes in your health, and be sure to contact your doctor if:    Your incontinence is getting worse.     You do not get better as expected.   Where can you learn more?  Go to https://www.[employer].net/patiented  Enter V684 in the search box to learn more about \"Bladder Training: Care Instructions.\"  Current as of: November 15, 2023  Content Version: 14.2 2024 [employer].   Care instructions adapted under license by your healthcare professional. If you have questions about a medical condition or this instruction, always ask your healthcare professional. [employer] disclaims any warranty or liability for your use of this information.       "

## 2024-11-01 ENCOUNTER — PATIENT OUTREACH (OUTPATIENT)
Dept: CARE COORDINATION | Facility: CLINIC | Age: 69
End: 2024-11-01
Payer: COMMERCIAL

## 2024-11-01 LAB
ALBUMIN SERPL BCG-MCNC: 4.1 G/DL (ref 3.5–5.2)
ALP SERPL-CCNC: 73 U/L (ref 40–150)
ALT SERPL W P-5'-P-CCNC: 24 U/L (ref 0–50)
ANION GAP SERPL CALCULATED.3IONS-SCNC: 7 MMOL/L (ref 7–15)
AST SERPL W P-5'-P-CCNC: 26 U/L (ref 0–45)
BILIRUB SERPL-MCNC: 0.6 MG/DL
BUN SERPL-MCNC: 14.3 MG/DL (ref 8–23)
CALCIUM SERPL-MCNC: 8.9 MG/DL (ref 8.8–10.4)
CHLORIDE SERPL-SCNC: 105 MMOL/L (ref 98–107)
CHOLEST SERPL-MCNC: 161 MG/DL
CREAT SERPL-MCNC: 0.69 MG/DL (ref 0.51–0.95)
EGFRCR SERPLBLD CKD-EPI 2021: >90 ML/MIN/1.73M2
FASTING STATUS PATIENT QL REPORTED: YES
FASTING STATUS PATIENT QL REPORTED: YES
GLUCOSE SERPL-MCNC: 90 MG/DL (ref 70–99)
HCO3 SERPL-SCNC: 29 MMOL/L (ref 22–29)
HDLC SERPL-MCNC: 68 MG/DL
LDLC SERPL CALC-MCNC: 82 MG/DL
NONHDLC SERPL-MCNC: 93 MG/DL
POTASSIUM SERPL-SCNC: 4.2 MMOL/L (ref 3.4–5.3)
PROT SERPL-MCNC: 6.9 G/DL (ref 6.4–8.3)
SODIUM SERPL-SCNC: 141 MMOL/L (ref 135–145)
TRIGL SERPL-MCNC: 56 MG/DL
TSH SERPL DL<=0.005 MIU/L-ACNC: 1.81 UIU/ML (ref 0.3–4.2)

## 2024-11-13 DIAGNOSIS — Z12.31 ENCOUNTER FOR SCREENING MAMMOGRAM FOR BREAST CANCER: Primary | ICD-10-CM

## 2024-11-13 NOTE — PROGRESS NOTES
1. Encounter for screening mammogram for breast cancer (Primary)  - MA Screen Bilateral w/Vishnu; Future     Lara Chávez DO

## 2024-11-14 ENCOUNTER — PATIENT OUTREACH (OUTPATIENT)
Dept: CARE COORDINATION | Facility: CLINIC | Age: 69
End: 2024-11-14
Payer: COMMERCIAL

## 2025-01-13 ENCOUNTER — TELEPHONE (OUTPATIENT)
Dept: FAMILY MEDICINE | Facility: CLINIC | Age: 70
End: 2025-01-13
Payer: COMMERCIAL

## 2025-01-13 NOTE — TELEPHONE ENCOUNTER
Patient Quality Outreach    Patient is due for the following:   Breast Cancer Screening - Mammogram    Action(s) Taken:   Patient to schedule a mammogram.    Type of outreach:    Sent [employer] message.    Questions for provider review:    None           Ashlie Solorio MA

## 2025-01-19 DIAGNOSIS — E78.2 MIXED HYPERLIPIDEMIA: ICD-10-CM

## 2025-01-20 RX ORDER — ROSUVASTATIN CALCIUM 5 MG/1
5 TABLET, COATED ORAL DAILY
Qty: 90 TABLET | Refills: 3 | OUTPATIENT
Start: 2025-01-20

## 2025-05-14 ENCOUNTER — ANCILLARY PROCEDURE (OUTPATIENT)
Dept: MAMMOGRAPHY | Facility: CLINIC | Age: 70
End: 2025-05-14
Attending: FAMILY MEDICINE
Payer: COMMERCIAL

## 2025-05-14 ENCOUNTER — HOSPITAL ENCOUNTER (OUTPATIENT)
Dept: BONE DENSITY | Facility: HOSPITAL | Age: 70
Discharge: HOME | End: 2025-05-14
Attending: FAMILY MEDICINE
Payer: COMMERCIAL

## 2025-05-14 DIAGNOSIS — Z12.31 ENCOUNTER FOR SCREENING MAMMOGRAM FOR BREAST CANCER: ICD-10-CM

## 2025-05-14 DIAGNOSIS — Z78.0 POSTMENOPAUSAL STATUS: ICD-10-CM

## 2025-05-14 PROCEDURE — 77063 BREAST TOMOSYNTHESIS BI: CPT

## 2025-05-28 ENCOUNTER — NURSE TRIAGE (OUTPATIENT)
Dept: FAMILY MEDICINE | Facility: CLINIC | Age: 70
End: 2025-05-28
Payer: COMMERCIAL

## 2025-05-28 NOTE — TELEPHONE ENCOUNTER
Patient calling due to having dizziness.    Last appointment with PCP 10/2024.    Patient reports some dizziness and on Monday she had dizziness and continues to have dizziness today. Patient reports continuous dizziness where she has difficulty walking in a straight line. Patient reports severity of dizziness is mild because she side steps while walking. Patient has intermittent nausea that coincides with the dizziness. Patient has ongoing issues with diarrhea that has been present for years and is not worse than usual.     No known aggravating factors.     Patient reports some dizziness historically and only lasted for a very short time,  but it would go away on its own and has never been seen before for dizziness.    BP yesterday: 140/80  BP today at 1:50 pm 162/68 and Pulse 71  1:55 pm 153/71  2:00 pm 149/76    History of mitral valve prolapse.    RN scheduled an office visit for tomorrow with Dr. Gary.     Reason for Disposition   MILD dizziness (e.g., walking normally) and has NOT been evaluated by physician for this (Exception: Dizziness caused by heat exposure, sudden standing, or poor fluid intake.)    Additional Information   Negative: SEVERE difficulty breathing (e.g., struggling for each breath, speaks in single words)   Negative: Shock suspected (e.g., cold/pale/clammy skin, too weak to stand, low BP, rapid pulse)   Negative: Difficult to awaken or acting confused (e.g., disoriented, slurred speech)   Negative: Fainted, and still feels dizzy afterwards   Negative: Overdose (accidental or intentional) of medications   Negative: New neurologic deficit that is present now: * Weakness of the face, arm, or leg on one side of the body * Numbness of the face, arm, or leg on one side of the body * Loss of speech or garbled speech   Negative: Heart beating < 50 beats per minute OR > 140 beats per minute   Negative: Sounds like a life-threatening emergency to the triager   Negative: Chest pain   Negative: Rectal  bleeding, bloody stool, or tarry-black stool   Negative: Vomiting is main symptom   Negative: Diarrhea is main symptom   Negative: Headache is main symptom   Negative: Heat exhaustion suspected (i.e., dehydration from heat exposure)   Negative: Patient states that they are having an anxiety or panic attack   Negative: Dizziness from low blood sugar (i.e., < 60 mg/dl or 3.5 mmol/l)   Negative: SEVERE dizziness (e.g., unable to stand, requires support to walk, feels like passing out now)   Negative: SEVERE headache or neck pain   Negative: Spinning or tilting sensation (vertigo) present now and one or more stroke risk factors (i.e., hypertension, diabetes mellitus, prior stroke/TIA, heart attack, age over 60) (Exception: Prior physician evaluation for this AND no different/worse than usual.)   Negative: Neurologic deficit that was brief (now gone), ANY of the following:* Weakness of the face, arm, or leg on one side of the body* Numbness of the face, arm, or leg on one side of the body* Loss of speech or garbled speech   Negative: Loss of vision or double vision  (Exception: Similar to previous migraines.)   Negative: Extra heartbeats, irregular heart beating, or heart is beating very fast (i.e., 'palpitations')   Negative: Difficulty breathing   Negative: Drinking very little and dehydration suspected (e.g., no urine > 12 hours, very dry mouth, very lightheaded)   Negative: Follows bleeding (e.g., stomach, rectum, vagina)  (Exception: Became dizzy from sight of small amount blood.)   Negative: Patient sounds very sick or weak to the triager   Negative: Lightheadedness (dizziness) present now, after 2 hours of rest and fluids   Negative: Spinning or tilting sensation (vertigo) present now   Negative: Fever > 103 F (39.4 C)   Negative: Fever > 100.0 F (37.8 C) and has diabetes mellitus or a weak immune system (e.g., HIV positive, cancer chemotherapy, organ transplant, splenectomy, chronic steroids)   Negative: MODERATE  dizziness (e.g., interferes with normal activities)  (Exception: Dizziness caused by heat exposure, sudden standing, or poor fluid intake.)   Negative: Vomiting occurs with dizziness   Negative: Patient wants to be seen   Negative: Taking a medicine that could cause dizziness (e.g., blood pressure medications, diuretics)    Protocols used: Dizziness-A-OH

## 2025-05-29 ENCOUNTER — OFFICE VISIT (OUTPATIENT)
Dept: FAMILY MEDICINE | Facility: CLINIC | Age: 70
End: 2025-05-29
Payer: COMMERCIAL

## 2025-05-29 VITALS
TEMPERATURE: 98.2 F | WEIGHT: 141.6 LBS | HEIGHT: 67 IN | OXYGEN SATURATION: 97 % | DIASTOLIC BLOOD PRESSURE: 84 MMHG | HEART RATE: 84 BPM | SYSTOLIC BLOOD PRESSURE: 136 MMHG | BODY MASS INDEX: 22.22 KG/M2 | RESPIRATION RATE: 16 BRPM

## 2025-05-29 DIAGNOSIS — R53.83 FATIGUE, UNSPECIFIED TYPE: ICD-10-CM

## 2025-05-29 DIAGNOSIS — R42 DYSEQUILIBRIUM: Primary | ICD-10-CM

## 2025-05-29 DIAGNOSIS — I34.1 MITRAL VALVE PROLAPSE: ICD-10-CM

## 2025-05-29 LAB
ALBUMIN SERPL BCG-MCNC: 4.3 G/DL (ref 3.5–5.2)
ALP SERPL-CCNC: 77 U/L (ref 40–150)
ALT SERPL W P-5'-P-CCNC: 27 U/L (ref 0–50)
ANION GAP SERPL CALCULATED.3IONS-SCNC: 9 MMOL/L (ref 7–15)
AST SERPL W P-5'-P-CCNC: 30 U/L (ref 0–45)
BASOPHILS # BLD AUTO: 0 10E3/UL (ref 0–0.2)
BASOPHILS NFR BLD AUTO: 1 %
BILIRUB SERPL-MCNC: 0.5 MG/DL
BUN SERPL-MCNC: 15.5 MG/DL (ref 8–23)
CALCIUM SERPL-MCNC: 9.1 MG/DL (ref 8.8–10.4)
CHLORIDE SERPL-SCNC: 103 MMOL/L (ref 98–107)
CREAT SERPL-MCNC: 0.71 MG/DL (ref 0.51–0.95)
EGFRCR SERPLBLD CKD-EPI 2021: >90 ML/MIN/1.73M2
EOSINOPHIL # BLD AUTO: 0.1 10E3/UL (ref 0–0.7)
EOSINOPHIL NFR BLD AUTO: 2 %
ERYTHROCYTE [DISTWIDTH] IN BLOOD BY AUTOMATED COUNT: 14 % (ref 10–15)
GLUCOSE SERPL-MCNC: 103 MG/DL (ref 70–99)
HCO3 SERPL-SCNC: 29 MMOL/L (ref 22–29)
HCT VFR BLD AUTO: 42.4 % (ref 35–47)
HGB BLD-MCNC: 13.7 G/DL (ref 11.7–15.7)
IMM GRANULOCYTES # BLD: 0 10E3/UL
IMM GRANULOCYTES NFR BLD: 0 %
LYMPHOCYTES # BLD AUTO: 1.2 10E3/UL (ref 0.8–5.3)
LYMPHOCYTES NFR BLD AUTO: 21 %
MCH RBC QN AUTO: 27.7 PG (ref 26.5–33)
MCHC RBC AUTO-ENTMCNC: 32.3 G/DL (ref 31.5–36.5)
MCV RBC AUTO: 86 FL (ref 78–100)
MONOCYTES # BLD AUTO: 0.4 10E3/UL (ref 0–1.3)
MONOCYTES NFR BLD AUTO: 6 %
NEUTROPHILS # BLD AUTO: 4.1 10E3/UL (ref 1.6–8.3)
NEUTROPHILS NFR BLD AUTO: 71 %
PLATELET # BLD AUTO: 205 10E3/UL (ref 150–450)
POTASSIUM SERPL-SCNC: 3.8 MMOL/L (ref 3.4–5.3)
PROT SERPL-MCNC: 7.2 G/DL (ref 6.4–8.3)
RBC # BLD AUTO: 4.95 10E6/UL (ref 3.8–5.2)
SODIUM SERPL-SCNC: 141 MMOL/L (ref 135–145)
TSH SERPL DL<=0.005 MIU/L-ACNC: 2.12 UIU/ML (ref 0.3–4.2)
WBC # BLD AUTO: 5.8 10E3/UL (ref 4–11)

## 2025-05-29 PROCEDURE — 99214 OFFICE O/P EST MOD 30 MIN: CPT | Performed by: FAMILY MEDICINE

## 2025-05-29 PROCEDURE — 3079F DIAST BP 80-89 MM HG: CPT | Performed by: FAMILY MEDICINE

## 2025-05-29 PROCEDURE — 84443 ASSAY THYROID STIM HORMONE: CPT | Performed by: FAMILY MEDICINE

## 2025-05-29 PROCEDURE — 85025 COMPLETE CBC W/AUTO DIFF WBC: CPT | Performed by: FAMILY MEDICINE

## 2025-05-29 PROCEDURE — 36415 COLL VENOUS BLD VENIPUNCTURE: CPT | Performed by: FAMILY MEDICINE

## 2025-05-29 PROCEDURE — 3075F SYST BP GE 130 - 139MM HG: CPT | Performed by: FAMILY MEDICINE

## 2025-05-29 PROCEDURE — 80053 COMPREHEN METABOLIC PANEL: CPT | Performed by: FAMILY MEDICINE

## 2025-05-29 RX ORDER — LORAZEPAM 1 MG/1
1 TABLET ORAL EVERY 6 HOURS PRN
Qty: 1 TABLET | Refills: 0 | Status: SHIPPED | OUTPATIENT
Start: 2025-05-29

## 2025-05-29 NOTE — PATIENT INSTRUCTIONS
Not sure the cause for the unbalanced feeling but given the blood pressure concerns and age I think ruling out a vascular cause is the first step.  Please call to schedule the MRI/MRA of the brain when you are able    You can take the lorazepam 30-60 minutes before your exam.  Please be sure to have a  to and from your study.    I will comment on labs from today when available.      Please schedule the Echocardiogram at any time, this is not related to the balance but just to look at that valve and ge a baseline    If your symptoms worsen again where you cannot walk a straight line or if you develop any numbness/tingling/weakness please be sure to go right the the ER for evaluation.

## 2025-05-29 NOTE — PROGRESS NOTES
"  {PROVIDER CHARTING PREFERENCE:727083}    Magali Mims is a 69 year old, presenting for the following health issues:  Dizziness        5/29/2025     1:05 PM   Additional Questions   Roomed by Kusum MCWILLIAMS   Accompanied by Self      History of Present Illness       Reason for visit:  Dizziness, high blood pressure, just do not feel normal and well  Symptom intensity:  Moderate  Symptom progression:  Staying the same  Had these symptoms before:  Yes  Has tried/received treatment for these symptoms:  No  What makes it better:  Resting   She is taking medications regularly.        Symptoms started on Monday.  Feels like things were at their worst Monday night and Tuesday.  At that time found that she was not able to walk normally.  Had to step out of a straight line.  Felt woozy, \"like she had been drugged\".  Sometime on Tuesday things felt like they had improved.    Felt fine Wednesday morning but then felt like more subtle symptoms of dysequilibrium returned Wednesday afternoon.    Feels like she has \"pressure\" from the chest to the top of her head.  Feels like her equilibrium is off.  States she feels like she has \"high blood pressure\"    Had elevated readings at home yesterday.  Home BP readings were the 130's-150's systolic.      Had similar symptoms in the past.  Where her equilibrium is off but generally would only last about an hour and resolve and were very intermittent.      No headache.  No numbness/tingling/weakness noted.    Now feels like the equilibrium issue has resolved, not happening now but feels like the \"pressure\" has remained.  Also feels like she is more conscious of her heart beat.  No palpitations or chest pains noted      Not exercising regularly but is active in her daily life.  Not as active a usual now as she feels fatigued and worn out since earlier in the week.  Feels like she would not lift her grandchildren at this point as she just feels unstable and like her balance is not " "right    No change in hearing or vision.  No recent illnesses.  No change in bowel symptoms, no urine symptoms.  Normal appetite.  Has been mildly nauseated occasionally but infrequently since these symptoms started.  Dysequilibrium does not seem to be associated with movement particularly.          {FLO Picklists (Optional):136909}      Objective    /84   Pulse 84   Temp 98.2  F (36.8  C) (Tympanic)   Resp 16   Ht 1.708 m (5' 7.25\")   Wt 64.2 kg (141 lb 9.6 oz)   SpO2 97%   BMI 22.01 kg/m    Body mass index is 22.01 kg/m .  Physical Exam   {Exam List (Optional):860079}    {Diagnostic Test Results (Optional):432814}        Signed Electronically by: Janice Gary DO  {Email feedback regarding this note to [email or web address]   :[id number]}  " "Dysequilibrium does not seem to be associated with movement particularly.            Review of Systems  Constitutional, HEENT, cardiovascular, pulmonary, gi and gu systems are negative, except as otherwise noted.      Objective    /84   Pulse 84   Temp 98.2  F (36.8  C) (Tympanic)   Resp 16   Ht 1.708 m (5' 7.25\")   Wt 64.2 kg (141 lb 9.6 oz)   SpO2 97%   BMI 22.01 kg/m    Body mass index is 22.01 kg/m .  Physical Exam   GENERAL: alert and no distress  EYES: Eyes grossly normal to inspection, PERRL and conjunctivae and sclerae normal  HENT: ear canals and TM's normal, nose and mouth without ulcers or lesions  NECK: no adenopathy, no asymmetry, masses, or scars  RESP: lungs clear to auscultation - no rales, rhonchi or wheezes  CV: regular rate and rhythm, normal S1 S2, no S3 or S4, no murmur, click or rub, no peripheral edema  ABDOMEN: soft, nontender, no hepatosplenomegaly, no masses and bowel sounds normal  MS: no gross musculoskeletal defects noted, no edema  NEURO: Normal strength and tone, sensory exam grossly normal, mentation intact, cranial nerves 2-12 intact, DTR's normal and symmetric 2/6, gait normal including heel/toe/tandem walking, Romberg normal, and rapid alternating movements normal  PSYCH: mentation appears normal, affect normal/bright    Epic reviewed        Signed Electronically by: Janice Gary, DO    "

## 2025-05-30 ENCOUNTER — RESULTS FOLLOW-UP (OUTPATIENT)
Dept: FAMILY MEDICINE | Facility: CLINIC | Age: 70
End: 2025-05-30

## 2025-06-01 ENCOUNTER — OFFICE VISIT (OUTPATIENT)
Dept: URGENT CARE | Facility: URGENT CARE | Age: 70
End: 2025-06-01
Payer: COMMERCIAL

## 2025-06-01 VITALS
WEIGHT: 142 LBS | OXYGEN SATURATION: 97 % | HEART RATE: 65 BPM | BODY MASS INDEX: 22.08 KG/M2 | TEMPERATURE: 97.8 F | DIASTOLIC BLOOD PRESSURE: 78 MMHG | RESPIRATION RATE: 18 BRPM | SYSTOLIC BLOOD PRESSURE: 154 MMHG

## 2025-06-01 DIAGNOSIS — I10 HYPERTENSION, UNSPECIFIED TYPE: ICD-10-CM

## 2025-06-01 DIAGNOSIS — H00.014 HORDEOLUM EXTERNUM OF LEFT UPPER EYELID: Primary | ICD-10-CM

## 2025-06-01 DIAGNOSIS — R42 DIZZINESS: ICD-10-CM

## 2025-06-01 PROCEDURE — 3078F DIAST BP <80 MM HG: CPT | Performed by: PHYSICIAN ASSISTANT

## 2025-06-01 PROCEDURE — 3077F SYST BP >= 140 MM HG: CPT | Performed by: PHYSICIAN ASSISTANT

## 2025-06-01 PROCEDURE — 99214 OFFICE O/P EST MOD 30 MIN: CPT | Performed by: PHYSICIAN ASSISTANT

## 2025-06-01 PROCEDURE — 1126F AMNT PAIN NOTED NONE PRSNT: CPT | Performed by: PHYSICIAN ASSISTANT

## 2025-06-01 RX ORDER — POLYMYXIN B SULFATE AND TRIMETHOPRIM 1; 10000 MG/ML; [USP'U]/ML
1-2 SOLUTION OPHTHALMIC EVERY 4 HOURS
Qty: 5 ML | Refills: 0 | Status: SHIPPED | OUTPATIENT
Start: 2025-06-01 | End: 2025-06-08

## 2025-06-01 ASSESSMENT — PAIN SCALES - GENERAL: PAINLEVEL_OUTOF10: NO PAIN (0)

## 2025-06-01 NOTE — PROGRESS NOTES
Assessment & Plan:      Problem List Items Addressed This Visit    None  Visit Diagnoses         Hordeolum externum of left upper eyelid    -  Primary    Relevant Medications    polymixin b-trimethoprim (POLYTRIM) 41232-2.1 UNIT/ML-% ophthalmic solution      Dizziness          Hypertension, unspecified type              Medical Decision Making  Patient presents for left upper eyelid swelling and redness and is also noted to have elevated blood pressure and ongoing issues with dizziness.  Left upper eyelid is consistent with a benign stye.  Recommend frequent warm compresses and prophylactic topical antibiotics.  Did examine patient's ears and was noted to have moderate ear effusion.  Suspect this could be contributing to some of patient's dizziness and recommend trial of Flonase.  She was still continue to follow-up with primary care and imaging as needed.  Patient will also continue to follow-up nonurgently with primary care for hypertension.     Subjective:      Felicita Henderson is a 69 year old female here for evaluation of left upper eyelid swelling and redness.  Onset of symptoms was 1 to 2 days ago with gradual worsening since then.  Patient also mentioned her high blood pressure here at the clinic as well as some ongoing follow-up for dizziness due to unknown cause.  Patient states that the dizziness has been present for a couple weeks.  She has already seen primary care for this and she has an appointment for an MRI coming up soon.     The following portions of the patient's history were reviewed and updated as appropriate: allergies, current medications, and problem list.     Review of Systems  Pertinent items are noted in HPI.    Allergies  No Known Allergies    Family History   Problem Relation Age of Onset    Hypertension Mother     Diabetes Type 1 Mother     Thyroid Cancer Mother     Diabetes Mother     Thyroid Disease Mother     Alzheimer Disease Father     Hyperlipidemia Father     Diabetes Type 1  Brother     Other Cancer Brother     Cerebrovascular Disease Maternal Grandmother     Colon Cancer Paternal Grandfather     Substance Abuse Daughter        Social History     Tobacco Use    Smoking status: Never     Passive exposure: Never    Smokeless tobacco: Never   Substance Use Topics    Alcohol use: Yes     Comment: 1-2/week        Objective:      BP (!) 154/78 (BP Location: Right arm, Patient Position: Sitting)   Pulse 65   Temp 97.8  F (36.6  C) (Oral)   Resp 18   Wt 64.4 kg (142 lb)   SpO2 97%   BMI 22.08 kg/m    General appearance - alert, well appearing, and in no distress and non-toxic  Eyes - left upper eyelid: Localized swelling and erythema seen, conjunctivae and sclera otherwise clear, no purulent discharge seen  Ears - TMs intact with thick mucoid fluid and mild bulging bilaterally, erythema    The use of [email or web address] dictation services was used to construct the content of this note; any grammatical errors are non-intentional. Please contact the author directly if you are in need of any clarification.

## 2025-06-01 NOTE — PROGRESS NOTES
Urgent Care Clinic Visit    Chief Complaint   Patient presents with    Eye Problem     Left eye swelling.                8/11/2021   Vision Screen   Vision Screen Results Pass   No Corrective Lenses, PLUS LENS REQUIRED Pass         6/1/2025    10:25 AM   Additional Questions   Roomed by liborio

## 2025-06-02 ENCOUNTER — HOSPITAL ENCOUNTER (OUTPATIENT)
Dept: BONE DENSITY | Facility: HOSPITAL | Age: 70
Discharge: HOME | End: 2025-06-02
Attending: FAMILY MEDICINE | Admitting: FAMILY MEDICINE
Payer: COMMERCIAL

## 2025-06-02 PROCEDURE — 77080 DXA BONE DENSITY AXIAL: CPT

## 2025-06-03 ENCOUNTER — HOSPITAL ENCOUNTER (OUTPATIENT)
Dept: MRI IMAGING | Facility: HOSPITAL | Age: 70
Discharge: HOME | End: 2025-06-03
Attending: FAMILY MEDICINE
Payer: COMMERCIAL

## 2025-06-03 DIAGNOSIS — R42 DYSEQUILIBRIUM: ICD-10-CM

## 2025-06-03 PROCEDURE — A9585 GADOBUTROL INJECTION: HCPCS | Performed by: FAMILY MEDICINE

## 2025-06-03 PROCEDURE — 70553 MRI BRAIN STEM W/O & W/DYE: CPT

## 2025-06-03 PROCEDURE — 70544 MR ANGIOGRAPHY HEAD W/O DYE: CPT

## 2025-06-03 PROCEDURE — 255N000002 HC RX 255 OP 636: Performed by: FAMILY MEDICINE

## 2025-06-03 RX ORDER — GADOBUTROL 604.72 MG/ML
6.5 INJECTION INTRAVENOUS ONCE
Status: COMPLETED | OUTPATIENT
Start: 2025-06-03 | End: 2025-06-03

## 2025-06-03 RX ADMIN — GADOBUTROL 6.5 ML: 604.72 INJECTION INTRAVENOUS at 18:37

## 2025-06-04 ENCOUNTER — RESULTS FOLLOW-UP (OUTPATIENT)
Dept: FAMILY MEDICINE | Facility: CLINIC | Age: 70
End: 2025-06-04

## 2025-06-04 ENCOUNTER — ANCILLARY PROCEDURE (OUTPATIENT)
Dept: MAMMOGRAPHY | Facility: CLINIC | Age: 70
End: 2025-06-04
Attending: FAMILY MEDICINE
Payer: COMMERCIAL

## 2025-06-04 DIAGNOSIS — R92.8 ABNORMAL MAMMOGRAM: ICD-10-CM

## 2025-06-04 PROCEDURE — 77061 BREAST TOMOSYNTHESIS UNI: CPT | Mod: RT

## 2025-06-04 PROCEDURE — 77065 DX MAMMO INCL CAD UNI: CPT | Mod: RT

## 2025-06-05 ENCOUNTER — RESULTS FOLLOW-UP (OUTPATIENT)
Dept: FAMILY MEDICINE | Facility: CLINIC | Age: 70
End: 2025-06-05

## 2025-08-30 ENCOUNTER — HOSPITAL ENCOUNTER (OUTPATIENT)
Dept: CARDIOLOGY | Facility: HOSPITAL | Age: 70
Discharge: HOME | End: 2025-08-30
Attending: FAMILY MEDICINE | Admitting: FAMILY MEDICINE
Payer: COMMERCIAL

## 2025-08-30 DIAGNOSIS — I34.1 MITRAL VALVE PROLAPSE: ICD-10-CM

## 2025-08-30 LAB — LVEF ECHO: NORMAL

## 2025-08-30 PROCEDURE — 93306 TTE W/DOPPLER COMPLETE: CPT | Mod: 26 | Performed by: STUDENT IN AN ORGANIZED HEALTH CARE EDUCATION/TRAINING PROGRAM

## 2025-08-30 PROCEDURE — 93306 TTE W/DOPPLER COMPLETE: CPT

## (undated) DEVICE — SOL WATER IRRIG 1000ML BOTTLE 2F7114

## (undated) DEVICE — BLADE KNIFE SURG 11 371111

## (undated) DEVICE — SU MONOCRYL+ 4-0 18IN PS2 UND MCP496G

## (undated) DEVICE — DECANTER VIAL 2006S

## (undated) DEVICE — ENDO SHEARS RENEW LAP ENDOCUT SCISSOR TIP 16.5MM 3142

## (undated) DEVICE — DRSG TEGADERM 2 1/2X 2 3/4"

## (undated) DEVICE — ENDO TROCAR OPTICAL ACCESS KII Z-THRD 05X100MM CTR03

## (undated) DEVICE — CUSTOM PACK LAP CHOLE SBA5BLCHEA

## (undated) DEVICE — BLADE CLIPPER PIVOT PURPLE DISP 9660

## (undated) DEVICE — SUTURE ENDO SURGITIE 21 POLYSORB EL23LN

## (undated) DEVICE — GLOVE BIOGEL PI ULTRATOUCH G SZ 8.0 42180

## (undated) DEVICE — DRSG TEGADERM 2 3/8X2 3/4" 1624W

## (undated) DEVICE — SUCTION MANIFOLD NEPTUNE 2 SYS 1 PORT 702-025-000

## (undated) DEVICE — Device

## (undated) DEVICE — TUBING SUCTION MEDI-VAC 1/4"X20' N620A

## (undated) DEVICE — DRSG TELFA 3X4" 1050

## (undated) DEVICE — GOWN XXL 9575

## (undated) DEVICE — TUBING SMOKE EVAC PNEUMOCLEAR HIGH FLOW 0620050250

## (undated) DEVICE — SYSTEM LAPAROVUE VISIBILITY LAPVUE10

## (undated) DEVICE — BASIN EMESIS STERILE  SSK9005A

## (undated) DEVICE — ENDO POUCH UNIVERSAL RETRIEVAL SYSTEM INZII 5MM CD003

## (undated) DEVICE — PLATE GROUNDING ADULT W/CORD 9165L

## (undated) DEVICE — PREP CHLORAPREP 26ML TINTED HI-LITE ORANGE 930815

## (undated) DEVICE — ESU LIGASURE MARYLAND LAPAROSCOPIC SLR/DVDR 5MMX37CM LF1937

## (undated) DEVICE — ENDO TROCAR SLEEVE KII Z-THREADED 05X100MM CTS02

## (undated) DEVICE — SU VICRYL+ 0 27 UR6 VLT VCP603H

## (undated) RX ORDER — PROPOFOL 10 MG/ML
INJECTION, EMULSION INTRAVENOUS
Status: DISPENSED
Start: 2023-02-10

## (undated) RX ORDER — DEXAMETHASONE SODIUM PHOSPHATE 10 MG/ML
INJECTION, SOLUTION INTRAMUSCULAR; INTRAVENOUS
Status: DISPENSED
Start: 2023-02-10

## (undated) RX ORDER — FENTANYL CITRATE 50 UG/ML
INJECTION, SOLUTION INTRAMUSCULAR; INTRAVENOUS
Status: DISPENSED
Start: 2023-02-10

## (undated) RX ORDER — ONDANSETRON 2 MG/ML
INJECTION INTRAMUSCULAR; INTRAVENOUS
Status: DISPENSED
Start: 2023-02-10

## (undated) RX ORDER — BUPIVACAINE HYDROCHLORIDE AND EPINEPHRINE 2.5; 5 MG/ML; UG/ML
INJECTION, SOLUTION INFILTRATION; PERINEURAL
Status: DISPENSED
Start: 2023-02-10